# Patient Record
Sex: FEMALE | Race: WHITE | NOT HISPANIC OR LATINO | Employment: OTHER | URBAN - METROPOLITAN AREA
[De-identification: names, ages, dates, MRNs, and addresses within clinical notes are randomized per-mention and may not be internally consistent; named-entity substitution may affect disease eponyms.]

---

## 2023-09-22 ENCOUNTER — APPOINTMENT (OUTPATIENT)
Dept: GENERAL RADIOLOGY | Facility: CLINIC | Age: 83
End: 2023-09-22
Attending: NURSE PRACTITIONER
Payer: MEDICARE

## 2023-09-22 ENCOUNTER — HOSPITAL ENCOUNTER (EMERGENCY)
Facility: CLINIC | Age: 83
Discharge: HOME | End: 2023-09-22
Attending: NURSE PRACTITIONER | Admitting: NURSE PRACTITIONER
Payer: MEDICARE

## 2023-09-22 VITALS
TEMPERATURE: 96.5 F | DIASTOLIC BLOOD PRESSURE: 76 MMHG | RESPIRATION RATE: 18 BRPM | SYSTOLIC BLOOD PRESSURE: 124 MMHG | OXYGEN SATURATION: 95 % | HEART RATE: 57 BPM | HEIGHT: 66 IN | WEIGHT: 160 LBS | BODY MASS INDEX: 25.71 KG/M2

## 2023-09-22 DIAGNOSIS — M25.572 PAIN AND SWELLING OF LEFT ANKLE: ICD-10-CM

## 2023-09-22 DIAGNOSIS — M25.472 PAIN AND SWELLING OF LEFT ANKLE: ICD-10-CM

## 2023-09-22 PROCEDURE — 99203 OFFICE O/P NEW LOW 30 MIN: CPT | Performed by: NURSE PRACTITIONER

## 2023-09-22 PROCEDURE — 73610 X-RAY EXAM OF ANKLE: CPT | Mod: LT

## 2023-09-22 PROCEDURE — G0463 HOSPITAL OUTPT CLINIC VISIT: HCPCS | Performed by: NURSE PRACTITIONER

## 2023-09-22 NOTE — ED PROVIDER NOTES
ED Provider Note  [facility]      History     Chief Complaint   Patient presents with    Ankle Pain     HPI  Lolis Pope is a 83 year old female who left-sided ankle swelling since Wednesday when she stepped/stumbled onto her pontoon boat.  She has been using ice to reduce swelling.  Has a history of A-fib taking a blood thinner whic may also have increased bruising under the skin.  Sensation is normal in left foot ankle and leg in comparison with right.  Denies any other injury.  No shortness of breath or breathing difficulty.    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     Priority: Medium        Past Medical History:    Past Medical History:   Diagnosis Date    Osteoporosis        Past Surgical History:    Past Surgical History:   Procedure Laterality Date    APPENDECTOMY      GYN SURGERY      hysterectomy    ORTHOPEDIC SURGERY      tib/fib fx ORIF, R)knee replacement    PHACOEMULSIFICATION WITH STANDARD INTRAOCULAR LENS IMPLANT  11/4/2010    PHACOEMULSIFICATION WITH STANDARD INTRAOCULAR LENS IMPLANT performed by ARLINE MONROE at [facility]    PHACOEMULSIFICATION WITH STANDARD INTRAOCULAR LENS IMPLANT  11/22/2010    PHACOEMULSIFICATION WITH STANDARD INTRAOCULAR LENS IMPLANT performed by ARLINE MONROE at [facility]       Family History:    No family history on file.    Social History:  Marital Status:   [5]  Social History     Tobacco Use    Smoking status: Never   Substance Use Topics    Alcohol use: Yes     Comment: occasional    Drug use: No        Medications:    ALENdronate (FOSAMAX) 70 MG tablet  aspirin 81 MG tablet  calcium-magnesium (CALMAG) 500-250 MG TABS  cholecalciferol (VITAMIN D) 400 UNIT TABS  dorzolamide-timolol (COSOPT) 22.3-6.8 MG/ML ophthalmic solution  fish oil-omega-3 fatty acids (FISH OIL)  "1000 MG capsule  folic acid (FOLVITE) 1 MG tablet  travoprost, benzalkonium, (TRAVATAN) 0.004 % ophthalmic solution  vitamin E 400 UNIT capsule          Review of Systems  A medically appropriate review of systems was performed with pertinent positives and negatives noted in the HPI, and all other systems negative.    Physical Exam   Patient Vitals for the past 24 hrs:   BP Temp Temp src Pulse Resp SpO2 Height Weight   09/22/23 1253 124/76 (!) 96.5  F (35.8  C) Tympanic 57 18 95 % 1.676 m (5' 6\") 72.6 kg (160 lb)          Physical Exam  General: alert and in no acute distress on arrival  Lungs:  nonlabored  CV:  extremities warm and perfused.  Right ankle: No swelling full range of motion normal CMS.  Left ankle: Moderate swelling around malleolus.  Tolerates pointing her toe with slight reduction in range of motion due to pain.  Neuro: Patient awake, alert, speech is fluent, normal gross motor in left and right lower extremities.  Psychiatric: affect/mood normal, very pleasant.      ED Course      X-ray of left ankle completed no fracture no displacement soft tissue swelling ordered a cam walker pneumatic boot to reduce swelling and increased ability encouraged ice and elevation.  Follow-up with primary provider or us if symptoms worsen despite recommended treatment.                          No results found for this or any previous visit (from the past 24 hour(s)).    MEDICATIONS GIVEN IN THE EMERGENCY DEPARTMENT:  Medications - No data to display             Assessments & Plan (with Medical Decision Making)  83 year old female who presents to the Urgent Care for evaluation of left ankle swelling and pain.       I have reviewed the nursing notes.    I have reviewed the findings, diagnosis, plan and need for follow up with the patient.       X-ray of left ankle completed no fracture no displacement soft tissue swelling ordered a cam walker pneumatic boot to reduce swelling and increased ability encouraged ice and " elevation.  Follow-up with primary provider or us if symptoms worsen despite recommended treatment.    NEW PRESCRIPTIONS STARTED AT TODAY'S ER VISIT  New Prescriptions    No medications on file       Final diagnoses:   None       9/22/2023   [facility] EMERGENCY DEPT       Loren Cleaning, APRN CNP  09/22/23 141

## 2023-09-22 NOTE — ED TRIAGE NOTES
Pt did not hit head during the fall, on blood thinner.      Triage Assessment       Row Name 09/22/23 1252       Triage Assessment (Adult)    Airway WDL WDL       Respiratory WDL    Respiratory WDL WDL

## 2023-09-22 NOTE — ED TRIAGE NOTES
Pt fell off pontoon on Wednesday with left ankle pain, pain continues.      Triage Assessment       Row Name 09/22/23 1252       Triage Assessment (Adult)    Airway WDL WDL       Respiratory WDL    Respiratory WDL WDL

## 2023-09-22 NOTE — DISCHARGE INSTRUCTIONS
Follow-up with your primary care if symptoms or not improving with use of the cam walking boot.  Icing and elevation is also helpful for swelling it is common to have some existing symptoms for several weeks following pulling of ligaments and swelling.

## 2025-05-14 ENCOUNTER — APPOINTMENT (OUTPATIENT)
Dept: CT IMAGING | Facility: CLINIC | Age: 85
End: 2025-05-14
Attending: FAMILY MEDICINE
Payer: MEDICARE

## 2025-05-14 ENCOUNTER — HOSPITAL ENCOUNTER (OUTPATIENT)
Facility: CLINIC | Age: 85
Setting detail: OBSERVATION
Discharge: HOME | End: 2025-05-15
Attending: FAMILY MEDICINE | Admitting: STUDENT IN AN ORGANIZED HEALTH CARE EDUCATION/TRAINING PROGRAM
Payer: MEDICARE

## 2025-05-14 DIAGNOSIS — I48.91 ATRIAL FIBRILLATION WITH RVR (H): ICD-10-CM

## 2025-05-14 DIAGNOSIS — I48.3 TYPICAL ATRIAL FLUTTER (H): Primary | ICD-10-CM

## 2025-05-14 DIAGNOSIS — A49.9 UTI (URINARY TRACT INFECTION), BACTERIAL: ICD-10-CM

## 2025-05-14 DIAGNOSIS — N39.0 UTI (URINARY TRACT INFECTION), BACTERIAL: ICD-10-CM

## 2025-05-14 DIAGNOSIS — I95.9 TRANSIENT HYPOTENSION: ICD-10-CM

## 2025-05-14 DIAGNOSIS — I10 ESSENTIAL HYPERTENSION: ICD-10-CM

## 2025-05-14 PROBLEM — R29.818 SUSPECTED SLEEP APNEA: Status: ACTIVE | Noted: 2021-08-03

## 2025-05-14 PROBLEM — I48.92 ATRIAL FLUTTER (H): Status: ACTIVE | Noted: 2021-07-26

## 2025-05-14 PROBLEM — J30.9 ALLERGIC RHINITIS: Status: ACTIVE | Noted: 2021-07-26

## 2025-05-14 PROBLEM — Z87.891 FORMER SMOKER: Status: ACTIVE | Noted: 2024-07-30

## 2025-05-14 LAB
ALBUMIN SERPL BCG-MCNC: 4.1 G/DL (ref 3.5–5.2)
ALBUMIN UR-MCNC: 20 MG/DL
ALP SERPL-CCNC: 66 U/L (ref 40–150)
ALT SERPL W P-5'-P-CCNC: 16 U/L (ref 0–50)
ANION GAP SERPL CALCULATED.3IONS-SCNC: 13 MMOL/L (ref 7–15)
APPEARANCE UR: ABNORMAL
AST SERPL W P-5'-P-CCNC: 21 U/L (ref 0–45)
ATRIAL RATE - MUSE: NORMAL BPM
BACTERIA #/AREA URNS HPF: ABNORMAL /HPF
BASOPHILS # BLD AUTO: 0 10E3/UL (ref 0–0.2)
BASOPHILS NFR BLD AUTO: 0 %
BILIRUB SERPL-MCNC: 0.6 MG/DL
BILIRUB UR QL STRIP: NEGATIVE
BUN SERPL-MCNC: 26.2 MG/DL (ref 8–23)
CALCIUM SERPL-MCNC: 9.7 MG/DL (ref 8.8–10.4)
CHLORIDE SERPL-SCNC: 100 MMOL/L (ref 98–107)
COLOR UR AUTO: YELLOW
CREAT SERPL-MCNC: 1.07 MG/DL (ref 0.51–0.95)
DIASTOLIC BLOOD PRESSURE - MUSE: NORMAL MMHG
EGFRCR SERPLBLD CKD-EPI 2021: 50 ML/MIN/1.73M2
EOSINOPHIL # BLD AUTO: 0.1 10E3/UL (ref 0–0.7)
EOSINOPHIL NFR BLD AUTO: 2 %
ERYTHROCYTE [DISTWIDTH] IN BLOOD BY AUTOMATED COUNT: 12.8 % (ref 10–15)
FLUAV RNA SPEC QL NAA+PROBE: NEGATIVE
FLUBV RNA RESP QL NAA+PROBE: NEGATIVE
GLUCOSE SERPL-MCNC: 114 MG/DL (ref 70–99)
GLUCOSE UR STRIP-MCNC: NEGATIVE MG/DL
HCO3 SERPL-SCNC: 23 MMOL/L (ref 22–29)
HCT VFR BLD AUTO: 43.1 % (ref 35–47)
HGB BLD-MCNC: 14.4 G/DL (ref 11.7–15.7)
HGB UR QL STRIP: NEGATIVE
HYALINE CASTS: 19 /LPF
IMM GRANULOCYTES # BLD: 0 10E3/UL
IMM GRANULOCYTES NFR BLD: 0 %
INTERPRETATION ECG - MUSE: NORMAL
KETONES UR STRIP-MCNC: NEGATIVE MG/DL
LACTATE SERPL-SCNC: 1.5 MMOL/L (ref 0.7–2)
LEUKOCYTE ESTERASE UR QL STRIP: ABNORMAL
LIPASE SERPL-CCNC: 36 U/L (ref 13–60)
LYMPHOCYTES # BLD AUTO: 1.9 10E3/UL (ref 0.8–5.3)
LYMPHOCYTES NFR BLD AUTO: 23 %
MAGNESIUM SERPL-MCNC: 1.5 MG/DL (ref 1.7–2.3)
MCH RBC QN AUTO: 31.3 PG (ref 26.5–33)
MCHC RBC AUTO-ENTMCNC: 33.4 G/DL (ref 31.5–36.5)
MCV RBC AUTO: 94 FL (ref 78–100)
MONOCYTES # BLD AUTO: 0.5 10E3/UL (ref 0–1.3)
MONOCYTES NFR BLD AUTO: 7 %
MUCOUS THREADS #/AREA URNS LPF: PRESENT /LPF
NEUTROPHILS # BLD AUTO: 5.5 10E3/UL (ref 1.6–8.3)
NEUTROPHILS NFR BLD AUTO: 68 %
NITRATE UR QL: NEGATIVE
NRBC # BLD AUTO: 0 10E3/UL
NRBC BLD AUTO-RTO: 0 /100
P AXIS - MUSE: NORMAL DEGREES
PH UR STRIP: 5 [PH] (ref 5–7)
PLATELET # BLD AUTO: 227 10E3/UL (ref 150–450)
POTASSIUM SERPL-SCNC: 4.5 MMOL/L (ref 3.4–5.3)
PR INTERVAL - MUSE: NORMAL MS
PROT SERPL-MCNC: 6.5 G/DL (ref 6.4–8.3)
QRS DURATION - MUSE: 122 MS
QT - MUSE: 336 MS
QTC - MUSE: 525 MS
R AXIS - MUSE: -39 DEGREES
RBC # BLD AUTO: 4.6 10E6/UL (ref 3.8–5.2)
RBC URINE: 6 /HPF
RSV RNA SPEC NAA+PROBE: NEGATIVE
SARS-COV-2 RNA RESP QL NAA+PROBE: NEGATIVE
SODIUM SERPL-SCNC: 136 MMOL/L (ref 135–145)
SP GR UR STRIP: 1.02 (ref 1–1.03)
SQUAMOUS EPITHELIAL: 2 /HPF
SYSTOLIC BLOOD PRESSURE - MUSE: NORMAL MMHG
T AXIS - MUSE: 106 DEGREES
TROPONIN T SERPL HS-MCNC: 13 NG/L
TROPONIN T SERPL HS-MCNC: 9 NG/L
UROBILINOGEN UR STRIP-MCNC: 2 MG/DL
VENTRICULAR RATE- MUSE: 147 BPM
WBC # BLD AUTO: 8.1 10E3/UL (ref 4–11)
WBC URINE: 95 /HPF

## 2025-05-14 PROCEDURE — G0378 HOSPITAL OBSERVATION PER HR: HCPCS

## 2025-05-14 PROCEDURE — 250N000011 HC RX IP 250 OP 636

## 2025-05-14 PROCEDURE — 71275 CT ANGIOGRAPHY CHEST: CPT

## 2025-05-14 PROCEDURE — 250N000009 HC RX 250: Performed by: FAMILY MEDICINE

## 2025-05-14 PROCEDURE — 81003 URINALYSIS AUTO W/O SCOPE: CPT | Performed by: FAMILY MEDICINE

## 2025-05-14 PROCEDURE — 96375 TX/PRO/DX INJ NEW DRUG ADDON: CPT | Mod: XU

## 2025-05-14 PROCEDURE — 93010 ELECTROCARDIOGRAM REPORT: CPT | Performed by: FAMILY MEDICINE

## 2025-05-14 PROCEDURE — 36415 COLL VENOUS BLD VENIPUNCTURE: CPT | Performed by: FAMILY MEDICINE

## 2025-05-14 PROCEDURE — 99285 EMERGENCY DEPT VISIT HI MDM: CPT | Mod: 25

## 2025-05-14 PROCEDURE — 99223 1ST HOSP IP/OBS HIGH 75: CPT

## 2025-05-14 PROCEDURE — 99285 EMERGENCY DEPT VISIT HI MDM: CPT | Mod: 25 | Performed by: FAMILY MEDICINE

## 2025-05-14 PROCEDURE — 93005 ELECTROCARDIOGRAM TRACING: CPT

## 2025-05-14 PROCEDURE — 250N000011 HC RX IP 250 OP 636: Performed by: FAMILY MEDICINE

## 2025-05-14 PROCEDURE — 96361 HYDRATE IV INFUSION ADD-ON: CPT

## 2025-05-14 PROCEDURE — 84484 ASSAY OF TROPONIN QUANT: CPT | Performed by: FAMILY MEDICINE

## 2025-05-14 PROCEDURE — 250N000013 HC RX MED GY IP 250 OP 250 PS 637

## 2025-05-14 PROCEDURE — 83690 ASSAY OF LIPASE: CPT | Performed by: FAMILY MEDICINE

## 2025-05-14 PROCEDURE — 83605 ASSAY OF LACTIC ACID: CPT | Performed by: FAMILY MEDICINE

## 2025-05-14 PROCEDURE — 87186 SC STD MICRODIL/AGAR DIL: CPT | Performed by: FAMILY MEDICINE

## 2025-05-14 PROCEDURE — 80053 COMPREHEN METABOLIC PANEL: CPT | Performed by: FAMILY MEDICINE

## 2025-05-14 PROCEDURE — 87637 SARSCOV2&INF A&B&RSV AMP PRB: CPT | Performed by: FAMILY MEDICINE

## 2025-05-14 PROCEDURE — 85004 AUTOMATED DIFF WBC COUNT: CPT | Performed by: FAMILY MEDICINE

## 2025-05-14 PROCEDURE — 83735 ASSAY OF MAGNESIUM: CPT

## 2025-05-14 PROCEDURE — 258N000003 HC RX IP 258 OP 636: Performed by: FAMILY MEDICINE

## 2025-05-14 PROCEDURE — 96365 THER/PROPH/DIAG IV INF INIT: CPT | Mod: XU

## 2025-05-14 RX ORDER — SIMVASTATIN 20 MG
20 TABLET ORAL DAILY
COMMUNITY
Start: 2024-11-08

## 2025-05-14 RX ORDER — PROCHLORPERAZINE MALEATE 5 MG/1
5 TABLET ORAL EVERY 6 HOURS PRN
Status: DISCONTINUED | OUTPATIENT
Start: 2025-05-14 | End: 2025-05-15 | Stop reason: HOSPADM

## 2025-05-14 RX ORDER — ONDANSETRON 4 MG/1
4 TABLET, ORALLY DISINTEGRATING ORAL EVERY 6 HOURS PRN
Status: DISCONTINUED | OUTPATIENT
Start: 2025-05-14 | End: 2025-05-15 | Stop reason: HOSPADM

## 2025-05-14 RX ORDER — PILOCARPINE HYDROCHLORIDE 10 MG/ML
1 SOLUTION/ DROPS OPHTHALMIC 3 TIMES DAILY
Status: DISCONTINUED | OUTPATIENT
Start: 2025-05-14 | End: 2025-05-15

## 2025-05-14 RX ORDER — DORZOLAMIDE HYDROCHLORIDE AND TIMOLOL MALEATE 20; 5 MG/ML; MG/ML
1 SOLUTION/ DROPS OPHTHALMIC 2 TIMES DAILY
Status: DISCONTINUED | OUTPATIENT
Start: 2025-05-14 | End: 2025-05-15 | Stop reason: HOSPADM

## 2025-05-14 RX ORDER — ASCORBIC ACID 500 MG
1 TABLET ORAL AT BEDTIME
COMMUNITY

## 2025-05-14 RX ORDER — ONDANSETRON 2 MG/ML
4 INJECTION INTRAMUSCULAR; INTRAVENOUS ONCE
Status: COMPLETED | OUTPATIENT
Start: 2025-05-14 | End: 2025-05-14

## 2025-05-14 RX ORDER — MAGNESIUM SULFATE HEPTAHYDRATE 40 MG/ML
4 INJECTION, SOLUTION INTRAVENOUS ONCE
Status: COMPLETED | OUTPATIENT
Start: 2025-05-14 | End: 2025-05-14

## 2025-05-14 RX ORDER — CEFTRIAXONE 2 G/1
2 INJECTION, POWDER, FOR SOLUTION INTRAMUSCULAR; INTRAVENOUS EVERY 24 HOURS
Status: DISCONTINUED | OUTPATIENT
Start: 2025-05-15 | End: 2025-05-15

## 2025-05-14 RX ORDER — OXYBUTYNIN CHLORIDE 5 MG/1
5 TABLET, EXTENDED RELEASE ORAL DAILY
Status: DISCONTINUED | OUTPATIENT
Start: 2025-05-15 | End: 2025-05-15 | Stop reason: HOSPADM

## 2025-05-14 RX ORDER — ONDANSETRON 2 MG/ML
4 INJECTION INTRAMUSCULAR; INTRAVENOUS EVERY 6 HOURS PRN
Status: DISCONTINUED | OUTPATIENT
Start: 2025-05-14 | End: 2025-05-15 | Stop reason: HOSPADM

## 2025-05-14 RX ORDER — RIVAROXABAN 20 MG/1
20 TABLET, FILM COATED ORAL EVERY EVENING
COMMUNITY

## 2025-05-14 RX ORDER — METOPROLOL SUCCINATE 25 MG/1
25 TABLET, EXTENDED RELEASE ORAL DAILY
Status: DISCONTINUED | OUTPATIENT
Start: 2025-05-14 | End: 2025-05-15 | Stop reason: HOSPADM

## 2025-05-14 RX ORDER — EPINEPHRINE 0.3 MG/.3ML
INJECTION SUBCUTANEOUS
COMMUNITY
Start: 2023-08-22

## 2025-05-14 RX ORDER — ACETAMINOPHEN 500 MG
1 TABLET ORAL 3 TIMES DAILY
COMMUNITY

## 2025-05-14 RX ORDER — CLINDAMYCIN HYDROCHLORIDE 150 MG/1
CAPSULE ORAL
COMMUNITY
Start: 2025-05-06

## 2025-05-14 RX ORDER — ACETAMINOPHEN 325 MG/1
650 TABLET ORAL EVERY 4 HOURS PRN
Status: DISCONTINUED | OUTPATIENT
Start: 2025-05-14 | End: 2025-05-15 | Stop reason: HOSPADM

## 2025-05-14 RX ORDER — CITALOPRAM HYDROBROMIDE 10 MG/1
10 TABLET ORAL DAILY
COMMUNITY
Start: 2025-04-15

## 2025-05-14 RX ORDER — LISINOPRIL AND HYDROCHLOROTHIAZIDE 12.5; 2 MG/1; MG/1
1 TABLET ORAL DAILY
Status: ON HOLD | COMMUNITY
Start: 2025-04-15 | End: 2025-05-15

## 2025-05-14 RX ORDER — LATANOPROST 50 UG/ML
1 SOLUTION/ DROPS OPHTHALMIC AT BEDTIME
Status: DISCONTINUED | OUTPATIENT
Start: 2025-05-14 | End: 2025-05-15 | Stop reason: HOSPADM

## 2025-05-14 RX ORDER — CITALOPRAM HYDROBROMIDE 10 MG/1
10 TABLET ORAL DAILY
Status: DISCONTINUED | OUTPATIENT
Start: 2025-05-14 | End: 2025-05-15 | Stop reason: HOSPADM

## 2025-05-14 RX ORDER — METOPROLOL TARTRATE 1 MG/ML
5 INJECTION, SOLUTION INTRAVENOUS EVERY 5 MIN PRN
Status: DISCONTINUED | OUTPATIENT
Start: 2025-05-14 | End: 2025-05-15 | Stop reason: HOSPADM

## 2025-05-14 RX ORDER — LATANOPROST 50 UG/ML
1 SOLUTION/ DROPS OPHTHALMIC AT BEDTIME
COMMUNITY

## 2025-05-14 RX ORDER — IOPAMIDOL 755 MG/ML
72 INJECTION, SOLUTION INTRAVASCULAR ONCE
Status: COMPLETED | OUTPATIENT
Start: 2025-05-14 | End: 2025-05-14

## 2025-05-14 RX ORDER — OXYBUTYNIN CHLORIDE 5 MG/1
5 TABLET, EXTENDED RELEASE ORAL DAILY
COMMUNITY
Start: 2025-04-15 | End: 2026-04-15

## 2025-05-14 RX ORDER — LISINOPRIL AND HYDROCHLOROTHIAZIDE 12.5; 2 MG/1; MG/1
1 TABLET ORAL DAILY
Status: DISCONTINUED | OUTPATIENT
Start: 2025-05-15 | End: 2025-05-15 | Stop reason: HOSPADM

## 2025-05-14 RX ORDER — SIMVASTATIN 20 MG
20 TABLET ORAL AT BEDTIME
Status: DISCONTINUED | OUTPATIENT
Start: 2025-05-14 | End: 2025-05-15 | Stop reason: HOSPADM

## 2025-05-14 RX ORDER — METOPROLOL TARTRATE 25 MG/1
25 TABLET, FILM COATED ORAL ONCE
Status: DISCONTINUED | OUTPATIENT
Start: 2025-05-14 | End: 2025-05-14

## 2025-05-14 RX ORDER — METOPROLOL SUCCINATE 25 MG/1
25 TABLET, EXTENDED RELEASE ORAL DAILY
COMMUNITY
Start: 2025-04-15

## 2025-05-14 RX ORDER — CEFTRIAXONE 2 G/1
2 INJECTION, POWDER, FOR SOLUTION INTRAMUSCULAR; INTRAVENOUS ONCE
Status: COMPLETED | OUTPATIENT
Start: 2025-05-14 | End: 2025-05-14

## 2025-05-14 RX ORDER — SENNOSIDES 8.6 MG/1
1 TABLET ORAL EVERY EVENING
COMMUNITY

## 2025-05-14 RX ORDER — PILOCARPINE HYDROCHLORIDE 10 MG/ML
1 SOLUTION/ DROPS OPHTHALMIC 3 TIMES DAILY
COMMUNITY

## 2025-05-14 RX ADMIN — LATANOPROST 1 DROP: 50 SOLUTION/ DROPS OPHTHALMIC at 20:57

## 2025-05-14 RX ADMIN — METOPROLOL SUCCINATE 25 MG: 25 TABLET, EXTENDED RELEASE ORAL at 17:29

## 2025-05-14 RX ADMIN — SIMVASTATIN 20 MG: 20 TABLET, FILM COATED ORAL at 20:57

## 2025-05-14 RX ADMIN — RIVAROXABAN 20 MG: 10 TABLET, FILM COATED ORAL at 19:02

## 2025-05-14 RX ADMIN — ACETAMINOPHEN 650 MG: 325 TABLET, FILM COATED ORAL at 23:54

## 2025-05-14 RX ADMIN — MAGNESIUM SULFATE HEPTAHYDRATE 4 G: 4 INJECTION, SOLUTION INTRAVENOUS at 16:03

## 2025-05-14 RX ADMIN — SODIUM CHLORIDE 1000 ML: 0.9 INJECTION, SOLUTION INTRAVENOUS at 12:19

## 2025-05-14 RX ADMIN — SODIUM CHLORIDE 98 ML: 9 INJECTION, SOLUTION INTRAVENOUS at 13:33

## 2025-05-14 RX ADMIN — IOPAMIDOL 72 ML: 755 INJECTION, SOLUTION INTRAVENOUS at 13:33

## 2025-05-14 RX ADMIN — ACETAMINOPHEN 650 MG: 325 TABLET, FILM COATED ORAL at 19:03

## 2025-05-14 RX ADMIN — METOPROLOL TARTRATE 5 MG: 5 INJECTION INTRAVENOUS at 15:55

## 2025-05-14 RX ADMIN — SODIUM CHLORIDE 1000 ML: 0.9 INJECTION, SOLUTION INTRAVENOUS at 10:43

## 2025-05-14 RX ADMIN — ONDANSETRON 4 MG: 2 INJECTION INTRAMUSCULAR; INTRAVENOUS at 10:48

## 2025-05-14 RX ADMIN — DORZOLAMIDE HYDROCHLORIDE AND TIMOLOL MALEATE 1 DROP: 20; 5 SOLUTION/ DROPS OPHTHALMIC at 20:57

## 2025-05-14 RX ADMIN — CEFTRIAXONE 2 G: 2 INJECTION, POWDER, FOR SOLUTION INTRAMUSCULAR; INTRAVENOUS at 12:27

## 2025-05-14 RX ADMIN — CITALOPRAM HYDROBROMIDE 10 MG: 10 TABLET ORAL at 17:29

## 2025-05-14 ASSESSMENT — COLUMBIA-SUICIDE SEVERITY RATING SCALE - C-SSRS
6. HAVE YOU EVER DONE ANYTHING, STARTED TO DO ANYTHING, OR PREPARED TO DO ANYTHING TO END YOUR LIFE?: NO
2. HAVE YOU ACTUALLY HAD ANY THOUGHTS OF KILLING YOURSELF IN THE PAST MONTH?: NO
1. IN THE PAST MONTH, HAVE YOU WISHED YOU WERE DEAD OR WISHED YOU COULD GO TO SLEEP AND NOT WAKE UP?: NO

## 2025-05-14 ASSESSMENT — ACTIVITIES OF DAILY LIVING (ADL)
ADLS_ACUITY_SCORE: 36
ADLS_ACUITY_SCORE: 51
ADLS_ACUITY_SCORE: 41
ADLS_ACUITY_SCORE: 36
ADLS_ACUITY_SCORE: 41
ADLS_ACUITY_SCORE: 36

## 2025-05-14 NOTE — ED NOTES
"[facility]   Admission Handoff    The patient is Lolis Pope, 85 year old who arrived in the ED by CAR from home with a complaint of Generalized Weakness, Nausea, and Hypotension  . The patient's current symptoms are new and during this time the symptoms have increased. In the ED, patient was diagnosed with   Final diagnoses:   UTI (urinary tract infection), bacterial   Atrial fibrillation with RVR (H)   Transient hypotension         Needed?: No    Allergies:    Allergies   Allergen Reactions    Seafood GI Disturbance and Hives    Shrimp      Other Reaction(s): Extrapyramidal Symptoms    Apixaban Rash    Influenza Virus Vaccine     Amoxicillin Rash       Past Medical Hx:   Past Medical History:   Diagnosis Date    Osteoporosis        Initial vitals were: BP: (!) 132/120 (right arm)  Pulse: (!) 146  Temp: 98.5  F (36.9  C)  Resp: 16  Height: 167.6 cm (5' 6\")  Weight: 72.6 kg (160 lb)  SpO2: 96 %   Recent vital Signs: BP 97/79   Pulse (!) 138   Temp 98.5  F (36.9  C) (Oral)   Resp 20   Ht 1.676 m (5' 6\")   Wt 72.6 kg (160 lb)   SpO2 93%   BMI 25.82 kg/m      Elimination Status: Continent: Yes     Activity Level: SBA    Fall Status: Reason for falls risk:  Mobility and Reason for falls risk: Elimination  nonskid shoes/slippers when out of bed, arm band in place, patient and family education, and assistive device/personal items within reach    Baseline Mental status: WDL  Current Mental Status changes: at basesline    Infection present or suspected this encounter: yes urinary  Sepsis suspected: No    Isolation type: None    Bariatric equipment needed?: No    In the ED these meds were given:   Medications   metoprolol (LOPRESSOR) injection 5 mg (5 mg Intravenous $Given 5/14/25 1099)   magnesium sulfate 4 g in 100 mL sterile water intermittent infusion (4 g Intravenous $New Bag 5/14/25 1601)   sodium chloride 0.9% BOLUS 1,000 mL (0 mLs Intravenous Stopped 5/14/25 1220) "   ondansetron (ZOFRAN) injection 4 mg (4 mg Intravenous $Given 5/14/25 1048)   sodium chloride 0.9% BOLUS 1,000 mL (0 mLs Intravenous Stopped 5/14/25 1324)   cefTRIAXone (ROCEPHIN) 2 g vial to attach to  ml bag for ADULTS or NS 50 ml bag for PEDS (0 g Intravenous Stopped 5/14/25 1324)   iopamidol (ISOVUE-370) solution 72 mL (72 mLs Intravenous $Given 5/14/25 1333)   sodium chloride 0.9 % bag for CT scan flush (98 mLs Intravenous $Given 5/14/25 1333)       Drips running?  Yes    Home pump  No    Current LDAs: Peripheral IV: Site LAC; Gauge 20  none     Results:   Labs/Imaging  Ordered and Resulted from Time of ED Arrival Up to the Time of Departure from the ED  Results for orders placed or performed during the hospital encounter of 05/14/25 (from the past 24 hours)   EKG 12-lead, tracing only   Result Value Ref Range    Systolic Blood Pressure  mmHg    Diastolic Blood Pressure  mmHg    Ventricular Rate 147 BPM    Atrial Rate  BPM    AL Interval  ms    QRS Duration 122 ms     ms    QTc 525 ms    P Axis  degrees    R AXIS -39 degrees    T Axis 106 degrees    Interpretation ECG       Wide QRS tachycardia  Left axis deviation  Left bundle branch block  Abnormal ECG  No previous ECGs available     CBC with platelets, differential    Narrative    The following orders were created for panel order CBC with platelets, differential.  Procedure                               Abnormality         Status                     ---------                               -----------         ------                     CBC with platelets and ...[[id number]]                      Final result                 Please view results for these tests on the individual orders.   Comprehensive metabolic panel   Result Value Ref Range    Sodium 136 135 - 145 mmol/L    Potassium 4.5 3.4 - 5.3 mmol/L    Carbon Dioxide (CO2) 23 22 - 29 mmol/L    Anion Gap 13 7 - 15 mmol/L    Urea Nitrogen 26.2 (H) 8.0 - 23.0 mg/dL    Creatinine 1.07 (H) 0.51 -  0.95 mg/dL    GFR Estimate 50 (L) >60 mL/min/1.73m2    Calcium 9.7 8.8 - 10.4 mg/dL    Chloride 100 98 - 107 mmol/L    Glucose 114 (H) 70 - 99 mg/dL    Alkaline Phosphatase 66 40 - 150 U/L    AST 21 0 - 45 U/L    ALT 16 0 - 50 U/L    Protein Total 6.5 6.4 - 8.3 g/dL    Albumin 4.1 3.5 - 5.2 g/dL    Bilirubin Total 0.6 <=1.2 mg/dL   Troponin T, High Sensitivity   Result Value Ref Range    Troponin T, High Sensitivity 13 <=14 ng/L   Lipase   Result Value Ref Range    Lipase 36 13 - 60 U/L   Lactic Acid Whole Blood with 1X Repeat in 2 HR when >2   Result Value Ref Range    Lactic Acid, Initial 1.5 0.7 - 2.0 mmol/L   CBC with platelets and differential   Result Value Ref Range    WBC Count 8.1 4.0 - 11.0 10e3/uL    RBC Count 4.60 3.80 - 5.20 10e6/uL    Hemoglobin 14.4 11.7 - 15.7 g/dL    Hematocrit 43.1 35.0 - 47.0 %    MCV 94 78 - 100 fL    MCH 31.3 26.5 - 33.0 pg    MCHC 33.4 31.5 - 36.5 g/dL    RDW 12.8 10.0 - 15.0 %    Platelet Count 227 150 - 450 10e3/uL    % Neutrophils 68 %    % Lymphocytes 23 %    % Monocytes 7 %    % Eosinophils 2 %    % Basophils 0 %    % Immature Granulocytes 0 %    NRBCs per 100 WBC 0 <1 /100    Absolute Neutrophils 5.5 1.6 - 8.3 10e3/uL    Absolute Lymphocytes 1.9 0.8 - 5.3 10e3/uL    Absolute Monocytes 0.5 0.0 - 1.3 10e3/uL    Absolute Eosinophils 0.1 0.0 - 0.7 10e3/uL    Absolute Basophils 0.0 0.0 - 0.2 10e3/uL    Absolute Immature Granulocytes 0.0 <=0.4 10e3/uL    Absolute NRBCs 0.0 10e3/uL   Influenza A/B, RSV and SARS-CoV2 PCR (COVID-19) Nose    Specimen: Nose; Swab   Result Value Ref Range    Influenza A PCR Negative Negative    Influenza B PCR Negative Negative    RSV PCR Negative Negative    SARS CoV2 PCR Negative Negative    Narrative    Testing was performed using the Xpert Xpress CoV2/Flu/RSV Assay on the [employer] Gene[employer]pert Instrument. This test should be ordered for the detection of SARS-CoV2, influenza, and RSV viruses in individuals with signs and symptoms of respiratory tract  infection. This test is for in vitro diagnostic use under the [place] FDA for laboratories certified under CLIA to perform high or moderate complexity testing. This test has been [place] FDA cleared. A negative result does not rule out the presence of PCR inhibitors in the specimen or target RNA in concentration below the limit of detection for the assay. If only one viral target is positive but coinfection with multiple targets is suspected, the sample should be re-tested with another FDA cleared, approved, or authorized test, if coninfection would change clinical management. This test was validated by the [facility] [employer]. These laboratories are certified under the Clinical Laboratory Improvement Amendments of 1988 (CLIA-88) as qualified to perfom high complexity laboratory testing.   UA with Microscopic reflex to Culture    Specimen: Urine, Midstream   Result Value Ref Range    Color Urine Yellow Colorless, Straw, Light Yellow, Yellow    Appearance Urine Slightly Cloudy (A) Clear    Glucose Urine Negative Negative mg/dL    Bilirubin Urine Negative Negative    Ketones Urine Negative Negative mg/dL    Specific Gravity Urine 1.025 1.003 - 1.035    Blood Urine Negative Negative    pH Urine 5.0 5.0 - 7.0    Protein Albumin Urine 20 (A) Negative mg/dL    Urobilinogen Urine 2.0 (A) Normal mg/dL    Nitrite Urine Negative Negative    Leukocyte Esterase Urine Large (A) Negative    Bacteria Urine Moderate (A) None Seen /HPF    Mucus Urine Present (A) None Seen /LPF    RBC Urine 6 (H) <=2 /HPF    WBC Urine 95 (H) <=5 /HPF    Squamous Epithelials Urine 2 (H) <=1 /HPF    Hyaline Casts Urine 19 (H) <=2 /LPF    Narrative    Urine Culture ordered based on laboratory criteria   Troponin T, High Sensitivity   Result Value Ref Range    Troponin T, High Sensitivity 9 <=14 ng/L   Magnesium   Result Value Ref Range    Magnesium 1.5 (L) 1.7 - 2.3 mg/dL   CT Chest Pulmonary Embolism w Contrast    Narrative    EXAM: CT CHEST PULMONARY  EMBOLISM W CONTRAST  LOCATION: [facility]  DATE: 5/14/2025    INDICATION: Off Xarelto for five days preoperatively, generalized weakness, hypotension, tachycardia, evaluate for PE.    COMPARISON: Chest x-ray dated 7/26/2021. CT chest, abdomen and pelvis dated 9/13/2009.    TECHNIQUE: CT chest pulmonary angiogram during arterial phase injection of intravenous contrast. Multiplanar reformats and MIP reconstructions were performed. Dose reduction techniques were used.     CONTRAST: 72 mL Isovue 370.    FINDINGS:  ANGIOGRAM CHEST: Pulmonary arteries are normal caliber and negative for pulmonary emboli. Thoracic aorta is not well opacified and is  indeterminate for dissection. No CT evidence of right heart strain.    LUNGS AND PLEURA: Interstitial opacities are seen in the left lower lobe. Large airways are patent. A few benign calcified pulmonary granulomas are present. No suspicious-appearing pulmonary nodules or masses. No pleural fluid.    MEDIASTINUM/AXILLAE: Heart size is mildly enlarged. No pericardial fluid. There is mild dilation of the ascending thoracic aorta measuring up to 3.9 cm. Moderate thoracic aortic atherosclerosis is present. The main pulmonary artery is mildly dilated at   3.5 cm, which can be seen with pulmonary artery hypertension in the appropriate clinical setting. A couple prominent to mildly enlarged AP window lymph nodes are present and mildly increased in size to comparison with the largest measuring 15 x 13 mm   (4-91), previously 15 x 11 mm.    CORONARY ARTERY CALCIFICATION: Mild.    UPPER ABDOMEN: There are several scattered hepatic cysts present, which are similar to comparison and do not require follow-up. 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 Mild to moderate atherosclerosis in the imaged abdominal aorta is noted.    MUSCULOSKELETAL: Slight scoliotic curvature of  the spine. Suspected bone island in a mid thoracic vertebral body segment is similar to comparison. No follow-up is necessary. No aggressive-appearing lytic or blastic osseous lesions.      Impression    IMPRESSION:  1.  No pulmonary artery embolism.  2.  Interstitial opacities in the left lower lobe which may represent scarring, atelectasis, edema and/or pneumonitis.  3.  Nonspecific, prominent to mildly enlarged AP window lymph nodes.  4.  Mild dilation of the ascending thoracic aorta.  5.  Dilation of the main pulmonary artery, which can be seen with pulmonary artery hypertension in the appropriate clinical setting.  6.  Mild cardiomegaly.         For the majority of the shift this patient's behavior was Green     Cardiac Rhythm: Atrial rhythm  Pt needs tele? Yes  Skin/wound Issues: None    Code Status: Full Code    Pain control: good    Nausea control: good    Abnormal labs/tests/findings requiring intervention: UA, Mg    Patient tested for COVID 19 prior to admission: YES     OBS brochure/video discussed/provided to patient/family: Yes     Family present during ED course? Yes     Family Comments/Social Situation comments: None    Tasks needing completion: None    Latia Boogie RN

## 2025-05-14 NOTE — ED NOTES
Pts SBP 86 after 1 L IVF. MD notified, writer started second L of IVF. Pt reports no new dizziness or weakness.

## 2025-05-14 NOTE — H&P
"[facility]    History and Physical  Hospital Medicine       Date of Admission:  5/14/2025  Date of Service: 5/14/2025     Assessment & Plan   Lolis Pope is a 85 year old female with past medical history of CKD3, atrial fibrillation on anticoagulation currently on hold for pre-planned outpatient surgery, sleep apnea, hypertension, former tobacco user now presents on 5/14/2025 with nausea, weakness, and hypotension. She is being admitted for afib with RVR & transient hypotension plus UTI.     Atrial flutter / fib with RVR  Hypotension    Presents feeling generally weak, run down, mildly lightheaded since 5/11. No chest pain or syncope. Checked BP AM 5/14 due to persistent \"off\" feeling & noticed it was low. Notably, has history of atrial flutter requiring cardioversion 7/26/2021.     On presentation to ER she is tachycardic ('s) and became hypotensive (BP 69/56). Received 2L IVF with minimal response in HR & slight improvement in BP (SBP 90's). EKG shows wide-complex tachycardia appearing like possible flutter? Troponin negative (13 ?  9). S/p 4g Mg sulfate in ER (initially Mg 1.5).     Trialing Lopressor in ER pending ongoing BP stability. Notably, patient has been holding Rivaroxaban for knee surgery scheduled 5/14 (cancelled) and just resumed 5/13. CT PE study negative for PE.   - Metoprolol tartrate IV 5mg Q15 min PRN x3 doses for heart rate >100bpm; holding for SBP <90  - Continuous telemetry  - Continue PTA metoprolol succinate from 25mg daily for now. Consider increase if BP allows.   - Continue PTA Rivaroxaban 20mg daily   - EP referral placed in discharge navigator    Possible heart failure    Presents with atrial flutter with RVR & hypotension. On admission exam she has mild JVD, no LE edema. CT PE study shows left lower lobe interstitial opacities which could be scarring vs edema / pneumonitis, and mild dilation of main pulmonary artery which can be seen with pulm " HTN.     Lexiscan in 2014 was WNL with EF 68%. ECHO 7/2021 LVEF 50% with mild-moderate RV systolic dysfunction & bi-atrial enlargement.     Clinically could be in mild HF 2/2 increased rates with a-flutter (above). Despite this, she tolerated 2L IVF in ER rather well.   - Hold on further IVF for now  - Anticipate improvement with rate control. Management of a-flutter, above.   - Continuous telemetry  - Strict I&O  - Daily weights    Urinary tract infection, bacterial  Chronic urinary urgency & incontinence    Mild darkening in urine coloration; chronic urgency & incontinence is unchanged. No flank or suprapubic pain.     UA suggestive of UTI: slightly cloudy, large leuk esterase, 95 WBC, moderate bacteria, 6 RBC, mucus, and 19 hyaline casts. Generally poor sample with 2 squamous cells.     No leukocytosis (WBC 8.1). Lactic acid WNL (1.5). Not septic.    Patient has mild UTI; infxn could be contributing etiology to flutter with RVR, above.   - Ceftriaxone 2g IV Q24hrs   - Urine culture 5/14 NGTD  - Continue PTA oxybutynin 5mg daily     Hypomagnesemia  Mg 1.5 on presentation. K >4.   - Mag sulfate 4g IV one time  - Recheck Mg in AM    Knee osteoarthritis  Regarding rescheduling knee arthroplasty, would consider rescheduling >4wks out given patient's conversion to sinus rhythm after admission.    Essential hypertension    Intermittently hypotensive since presentation 2/2 afib with RVR.   - Continue PTA lisinopril-hydrochlorothiazide 20-12.5mg daily with hold parameters    CKD (chronic kidney disease) stage 3  Baseline creatinine around 1.0, creatinine on presentation 1.07, GFR 50.     Obstructive sleep apnea  Referred after episode of atrial flutter in 2021. Sleep study showed ESTELA. Was prescribed PAP therapy, unclear if she ever started this.     Clinically Significant Risk Factors Present on Admission                # Drug Induced Coagulation Defect: home medication list includes an anticoagulant medication    #  "Hypertension: Noted on problem list           # Overweight: Estimated body mass index is 25.82 kg/m  as calculated from the following:    Height as of this encounter: 1.676 m (5' 6\").    Weight as of this encounter: 72.6 kg (160 lb).               Diet:  cardiac   DVT Prophylaxis: DOAC  Blakely Catheter: Not present  Code Status:  full code  Lines: PIV, telemetry    Disposition Plan   Medically Ready for Discharge: Anticipated Tomorrow     The patient's care was discussed with the Attending Physician, Dr. Anoop Méndez, bedside RN, and the patient.    Vivienne Brice PA-C        Primary Care Physician   Snehal Napoles [phone]    History is obtained from the patient, who is a decent historian, patient's son who is at the bedside and provides helpful supplemental information, handoff from ER provider, and review of old records via the EMR.    History of Present Illness   Lolis Pope is a 85 year old female with past medical history of CKD3, atrial fibrillation on anticoagulation currently on hold for pre-planned outpatient surgery, sleep apnea, hypertension, former tobacco user now presents on 5/14/2025 with nausea, weakness, and hypotension.     Reports a history of afib for which she was admitted in [facility] and required cardioversion after failure of pharmacologic management to control rate.     Now says that beginning Sunday 5/11 she has been feeling generally run-down, weak, and not herself. She feels intermittently lightheaded, without syncope or falls. Denies chest pain, pressure, palpitations, or pleurisy. Does endorse worsening heartburn which she notices randomly as well as after meals, increasing in frequency over the past few months. Denies other abdominal pain, nausea, or emesis. She feels symptoms are similar to when she had afib with RVR previously.     Denies fevers or chills. Denies dysuria, hematuria. Endorses intermittently dark urine but no changes in smell. Has chronic nocturia about " 4x/night with some incontinence which is not new and unchanged from baseline.     Patient presented to ER 5/14 because she was not feeling any better this AM & took her BP and noticed it was much lower than usual. She has been taking her medications as prescribed, and has been holding her Rivaroxaban for a knee surgery. Unfortunately, her knee surgery scheduled for 5/14 was cancelled because she had to have a tooth extraction about 1 week PTA and when orthopedics found out about extraction they wanted to wait until oral healing was complete to plan surgery. She resumed her Rivaroxaban on 5/13.     Review of Systems    ROS: 10 point ROS neg other than the symptoms noted above in the HPI.    Past Medical History    Past Medical History:   Diagnosis Date    Osteoporosis      Past Surgical History   Past Surgical History:   Procedure Laterality Date    APPENDECTOMY      GYN SURGERY      hysterectomy    ORTHOPEDIC SURGERY      tib/fib fx ORIF, R)knee replacement    PHACOEMULSIFICATION WITH STANDARD INTRAOCULAR LENS IMPLANT  11/4/2010    PHACOEMULSIFICATION WITH STANDARD INTRAOCULAR LENS IMPLANT performed by ARLINE MONROE at [facility]    PHACOEMULSIFICATION WITH STANDARD INTRAOCULAR LENS IMPLANT  11/22/2010    PHACOEMULSIFICATION WITH STANDARD INTRAOCULAR LENS IMPLANT performed by ARLINE MONROE at [facility]      Prior to Admission Medications   Prior to Admission Medications   Prescriptions Last Dose Informant Patient Reported? Taking?   EPINEPHrine (ANY BX GENERIC EQUIV) 0.3 MG/0.3ML injection 2-pack  Self Yes Yes   Sig: INJECT CONTENTS OF 1 PEN AS NEEDED FOR ALLERGIC REACTION AS NEEDED. MAY REPEAT   Melatonin 300 MCG TABS 5/13/2025 Bedtime  Yes Yes   Sig: Take 1 tablet by mouth at bedtime.   XARELTO ANTICOAGULANT 20 MG TABS tablet 5/13/2025 Evening Self Yes Yes   Sig: Take 20 mg by mouth every evening.   acetaminophen (TYLENOL) 500 MG tablet 5/14/2025 Morning  Yes Yes   Sig: Take 1 tablet by mouth 3 times daily.    calcium-magnesium (CALMAG) 500-250 MG TABS 5/4/2025 Self Yes Yes   Sig: Take 1 tablet by mouth 2 times daily (with meals).   cholecalciferol (VITAMIN D) 400 UNIT TABS 5/4/2025 Self Yes Yes   Sig: Take 400 Units by mouth daily.   citalopram (CELEXA) 10 MG tablet 5/13/2025 Morning Self Yes Yes   Sig: Take 10 mg by mouth daily.   clindamycin (CLEOCIN) 150 MG capsule  Self Yes Yes   Sig: TAKE FOUR CAPSULES BY MOUTH ONE HOUR BEFORE APPOINTMENT   dorzolamide-timolol (COSOPT) 22.3-6.8 MG/ML ophthalmic solution 5/14/2025 Morning Self Yes Yes   Sig: Place 1 drop into both eyes 2 times daily.   latanoprost (XALATAN) 0.005 % ophthalmic solution 5/13/2025 Evening Self Yes Yes   Sig: INSTILL 1 DROP INTO EACH EYE IN THE EVENING   lisinopril-hydrochlorothiazide (ZESTORETIC) 20-12.5 MG tablet 5/14/2025 Morning Self Yes Yes   Sig: Take 1 tablet by mouth daily.   metoprolol succinate ER (TOPROL XL) 25 MG 24 hr tablet 5/13/2025 Evening Self Yes Yes   Sig: Take 25 mg by mouth daily.   oxyBUTYnin ER (DITROPAN XL) 5 MG 24 hr tablet 5/14/2025 Morning Self Yes Yes   Sig: Take 5 mg by mouth daily.   pilocarpine (PILOCAR) 1 % ophthalmic solution 5/14/2025 Morning  Yes Yes   Sig: Place 1 drop into the right eye 3 times daily.   senna (SENOKOT) 8.6 MG tablet 5/13/2025 Evening  Yes Yes   Sig: Take 1 tablet by mouth every evening.   simvastatin (ZOCOR) 20 MG tablet 5/13/2025 Bedtime Self Yes Yes   Sig: Take 20 mg by mouth daily.   vitamin E 400 UNIT capsule 5/4/2025 Self Yes Yes   Sig: Take 1 capsule by mouth daily.      Facility-Administered Medications: None     Allergies   Allergies   Allergen Reactions    Seafood GI Disturbance and Hives    Shrimp      Other Reaction(s): Extrapyramidal Symptoms    Apixaban Rash    Influenza Virus Vaccine     Amoxicillin Rash     Family History    No family history on file.    Social History   Social History     Socioeconomic History    Marital status:      Physical Exam   /79   Pulse (!) 135   " Temp 98.5  F (36.9  C) (Oral)   Resp 16   Ht 1.676 m (5' 6\")   Wt 72.6 kg (160 lb)   SpO2 93%   BMI 25.82 kg/m       Weight: 160 lbs 0 oz Body mass index is 25.82 kg/m .     Constitutional: Alert, oriented, cooperative, no apparent distress, appears nontoxic  Eyes: Eyes are clear  HENT: Oropharynx is clear and moist. Tongue midline. No evidence of cranial trauma.  Cardiovascular: Tachycardic, irregular rhythm. No obvious murmur. Mildly elevated JVP. Radial pulse 2+. No pitting LE edema.   Respiratory: Unlabored on room air. Mildly coarse in left base, otherwise CTAB remainder of fields bilaterally.   GI: Soft, non-tender, normal bowel sounds, non-distended.   Genitourinary: Deferred  Musculoskeletal: Normal muscle bulk, no obvious joint deformities.   Skin: Warm and dry, no rashes.   Neurologic:  is symmetric. No tremor. Alert & interacting appropriately.     Data   Data reviewed today:     I have personally reviewed the following data over the past 24 hrs:    8.1  \   14.4   / 227     136 100 26.2 (H) /  114 (H)   4.5 23 1.07 (H) \     ALT: 16 AST: 21 AP: 66 TBILI: 0.6   ALB: 4.1 TOT PROTEIN: 6.5 LIPASE: 36     Trop: 9 BNP: N/A     Procal: N/A CRP: N/A Lactic Acid: 1.5         Recent Results (from the past 24 hours)   CT Chest Pulmonary Embolism w Contrast    Narrative    EXAM: CT CHEST PULMONARY EMBOLISM W CONTRAST  LOCATION: [facility]  DATE: 5/14/2025    INDICATION: Off Xarelto for five days preoperatively, generalized weakness, hypotension, tachycardia, evaluate for PE.    COMPARISON: Chest x-ray dated 7/26/2021. CT chest, abdomen and pelvis dated 9/13/2009.    TECHNIQUE: CT chest pulmonary angiogram during arterial phase injection of intravenous contrast. Multiplanar reformats and MIP reconstructions were performed. Dose reduction techniques were used.     CONTRAST: 72 mL Isovue 370.    FINDINGS:  ANGIOGRAM CHEST: Pulmonary arteries are normal caliber and negative for " pulmonary emboli. Thoracic aorta is not well opacified and is  indeterminate for dissection. No CT evidence of right heart strain.    LUNGS AND PLEURA: Interstitial opacities are seen in the left lower lobe. Large airways are patent. A few benign calcified pulmonary granulomas are present. No suspicious-appearing pulmonary nodules or masses. No pleural fluid.    MEDIASTINUM/AXILLAE: Heart size is mildly enlarged. No pericardial fluid. There is mild dilation of the ascending thoracic aorta measuring up to 3.9 cm. Moderate thoracic aortic atherosclerosis is present. The main pulmonary artery is mildly dilated at   3.5 cm, which can be seen with pulmonary artery hypertension in the appropriate clinical setting. A couple prominent to mildly enlarged AP window lymph nodes are present and mildly increased in size to comparison with the largest measuring 15 x 13 mm   (4-91), previously 15 x 11 mm.    CORONARY ARTERY CALCIFICATION: Mild.    UPPER ABDOMEN: There are several scattered hepatic cysts present, which are similar to comparison and do not require follow-up. 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 Mild to moderate atherosclerosis in the imaged abdominal aorta is noted.    MUSCULOSKELETAL: Slight scoliotic curvature of the spine. Suspected bone island in a mid thoracic vertebral body segment is similar to comparison. No follow-up is necessary. No aggressive-appearing lytic or blastic osseous lesions.      Impression    IMPRESSION:  1.  No pulmonary artery embolism.  2.  Interstitial opacities in the left lower lobe which may represent scarring, atelectasis, edema and/or pneumonitis.  3.  Nonspecific, prominent to mildly enlarged AP window lymph nodes.  4.  Mild dilation of the ascending thoracic aorta.  5.  Dilation of the main pulmonary artery, which can be seen with pulmonary artery hypertension in the appropriate  clinical setting.  6.  Mild cardiomegaly.

## 2025-05-14 NOTE — PROGRESS NOTES
Skin affirmation note    Admitting nurse completed full skin assessment, Manjeet score and Manjeet interventions. This writer agrees with the initial skin assessment findings.

## 2025-05-14 NOTE — PLAN OF CARE
"  Neuro: A&Ox4     Cardiac: Upon admission the pt's HR was in the 130's, scheduled oral metoprolol given. Pt converted to SR around 1755. HR is currently in the 60's.    Resp: RA with O2 sats in the mid 90's, Lung sounds diminished to all fields with coarse crackles to the RLL    GI/: WNL    MSK: SBA with cane, steady gait    Skin: Has some dry, flaky skin to her feet and bilateral shins    LDAs: 1 PIV SL      Problem: Dysrhythmia  Goal: Normalized Cardiac Rhythm  Outcome: Not Progressing     Problem: Adult Inpatient Plan of Care  Goal: Plan of Care Review  Description: The Plan of Care Review/Shift note should be completed every shift.  The Outcome Evaluation is a brief statement about your assessment that the patient is improving, declining, or no change.  This information will be displayed automatically on your shiftnote.  Outcome: Progressing  Goal: Patient-Specific Goal (Individualized)  Description: You can add care plan individualizations to a care plan. Examples of Individualization might be:  \"Parent requests to be called daily at 9am for status\", \"I have a hard time hearing out of my right ear\", or \"Do not touch me to wake me up as it startlesme\".  Outcome: Progressing  Goal: Absence of Hospital-Acquired Illness or Injury  Outcome: Progressing  Intervention: Identify and Manage Fall Risk  Recent Flowsheet Documentation  Taken 5/14/2025 1718 by Jeannie Calabrese RN  Safety Promotion/Fall Prevention:   activity supervised   clutter free environment maintained   nonskid shoes/slippers when out of bed   patient and family education   lighting adjusted  Intervention: Prevent Skin Injury  Recent Flowsheet Documentation  Taken 5/14/2025 1718 by Jeannie Calabrese RN  Body Position: position changed independently  Goal: Optimal Comfort and Wellbeing  Outcome: Progressing  Intervention: Monitor Pain and Promote Comfort  Recent Flowsheet Documentation  Taken 5/14/2025 1718 by Jeannie Calabrese" RN  Pain Management Interventions:   care clustered   emotional support   pillow support provided   quiet environment facilitated   rest  Intervention: Provide Person-Centered Care  Recent Flowsheet Documentation  Taken 5/14/2025 1718 by Jeannie Calabrese RN  Trust Relationship/Rapport:   care explained   choices provided   emotional support provided   questions answered   reassurance provided   thoughts/feelings acknowledged  Goal: Readiness for Transition of Care  Outcome: Progressing     Problem: Delirium  Goal: Optimal Coping  Outcome: Progressing  Goal: Improved Behavioral Control  Outcome: Progressing  Intervention: Minimize Safety Risk  Recent Flowsheet Documentation  Taken 5/14/2025 1718 by Jeannie Calabrese RN  Enhanced Safety Measures: room near unit station  Trust Relationship/Rapport:   care explained   choices provided   emotional support provided   questions answered   reassurance provided   thoughts/feelings acknowledged  Goal: Improved Attention and Thought Clarity  Outcome: Progressing  Goal: Improved Sleep  Outcome: Progressing     Problem: Risk for Delirium  Goal: Optimal Coping  Outcome: Progressing  Goal: Improved Behavioral Control  Outcome: Progressing  Intervention: Minimize Safety Risk  Recent Flowsheet Documentation  Taken 5/14/2025 1718 by Jeannie Calabrese RN  Enhanced Safety Measures: room near unit station  Trust Relationship/Rapport:   care explained   choices provided   emotional support provided   questions answered   reassurance provided   thoughts/feelings acknowledged  Goal: Improved Attention and Thought Clarity  Outcome: Progressing  Goal: Improved Sleep  Outcome: Progressing     Problem: UTI (Urinary Tract Infection)  Goal: Improved Infection Symptoms  Outcome: Progressing   Goal Outcome Evaluation:

## 2025-05-14 NOTE — ED PROVIDER NOTES
History     Chief Complaint   Patient presents with    Generalized Weakness    Nausea    Hypotension   Weakness,low BP  HPI  Lolis Pope is a 85 year old female, past medical history is significant for osteoporosis, former smoker, suspected sleep apnea, allergic rhinitis, atrial flutter, stage III kidney disease, hypertension, presents to the emergency department with concerns of lightheadedness/dizziness, weakness nausea and low blood pressure.  History is obtained from the patient who presents with her son with concerns of dizziness (no abnormal sensation of movement, rather lightheadedness) over the last 24-48 hours, feeling weaker than usual, low-grade nausea, poor fluid intake.  Denies URI type symptoms, no UTI type symptoms.  2 episodes of diarrhea where she normally is constipated.  The patient states that she has not been taking fluids very well.  She was preparing to have a knee surgery replacement left knee today but unfortunately she wanted up with a dental extraction that was somewhat unplanned this past Friday when she went in for what was supposed to be a crown and they went up extracting the tooth.  She contacted her surgeon of record yesterday and they canceled her surgery.  The patient had held her Xarelto as directed up until yesterday, 5 days of Xarelto hold, first dose of Xarelto was yesterday on restart.  She has been taking her other medications with the exception of multivitamin and her calcium magnesium supplement.  (She has been taking her beta-blocker which is Toprol-XL 25 mg once a day).  She has not noticed any chest pain or shortness of breath.  No fevers.  Has not noticed her heart being fast and presents with a heart rate of 153 bpm in triage.      Allergies:  Allergies   Allergen Reactions    Seafood GI Disturbance and Hives    Shrimp      Other Reaction(s): Extrapyramidal Symptoms    Apixaban Rash    Influenza Virus Vaccine     Amoxicillin Rash       Problem List:    Patient  Active Problem List    Diagnosis Date Noted    Former smoker 07/30/2024     Priority: Medium    Suspected sleep apnea 08/03/2021     Priority: Medium    Allergic rhinitis 07/26/2021     Priority: Medium    Atrial flutter (H) 07/26/2021     Priority: Medium    CKD (chronic kidney disease) stage 3, GFR 30-59 ml/min (H) 11/18/2016     Priority: Medium    Essential hypertension 06/21/2012     Priority: Medium    Senile nuclear sclerosis 11/03/2010     Priority: Medium        Past Medical History:    Past Medical History:   Diagnosis Date    Osteoporosis        Past Surgical History:    Past Surgical History:   Procedure Laterality Date    APPENDECTOMY      GYN SURGERY      hysterectomy    ORTHOPEDIC SURGERY      tib/fib fx ORIF, R)knee replacement    PHACOEMULSIFICATION WITH STANDARD INTRAOCULAR LENS IMPLANT  11/4/2010    PHACOEMULSIFICATION WITH STANDARD INTRAOCULAR LENS IMPLANT performed by ARLINE MONROE at [facility]    PHACOEMULSIFICATION WITH STANDARD INTRAOCULAR LENS IMPLANT  11/22/2010    PHACOEMULSIFICATION WITH STANDARD INTRAOCULAR LENS IMPLANT performed by ARLINE MONROE at [facility]       Family History:    No family history on file.    Social History:  Marital Status:   [5]  Social History     Tobacco Use    Smoking status: Never   Substance Use Topics    Alcohol use: Yes     Comment: occasional    Drug use: No        Medications:    acetaminophen (TYLENOL) 500 MG tablet  calcium-magnesium (CALMAG) 500-250 MG TABS  cholecalciferol (VITAMIN D) 400 UNIT TABS  citalopram (CELEXA) 10 MG tablet  clindamycin (CLEOCIN) 150 MG capsule  dorzolamide-timolol (COSOPT) 22.3-6.8 MG/ML ophthalmic solution  EPINEPHrine (ANY BX GENERIC EQUIV) 0.3 MG/0.3ML injection 2-pack  latanoprost (XALATAN) 0.005 % ophthalmic solution  lisinopril-hydrochlorothiazide (ZESTORETIC) 20-12.5 MG tablet  Melatonin 300 MCG TABS  metoprolol succinate ER (TOPROL XL) 25 MG 24 hr tablet  oxyBUTYnin ER (DITROPAN XL) 5 MG 24 hr  "tablet  pilocarpine (PILOCAR) 1 % ophthalmic solution  senna (SENOKOT) 8.6 MG tablet  simvastatin (ZOCOR) 20 MG tablet  vitamin E 400 UNIT capsule  XARELTO ANTICOAGULANT 20 MG TABS tablet          Review of Systems   All other systems reviewed and are negative.      Physical Exam   BP: (!) 132/120 (right arm)  Pulse: (!) 146  Temp: 98.5  F (36.9  C)  Resp: 16  Height: 167.6 cm (5' 6\")  Weight: 72.6 kg (160 lb)  SpO2: 96 %      Physical Exam  Vitals and nursing note reviewed.   Constitutional:       General: She is not in acute distress.     Appearance: Normal appearance. She is normal weight. She is not ill-appearing.   HENT:      Head: Normocephalic and atraumatic.      Right Ear: Tympanic membrane, ear canal and external ear normal.      Left Ear: Tympanic membrane, ear canal and external ear normal.      Nose: Nose normal.      Mouth/Throat:      Mouth: Mucous membranes are dry.      Pharynx: Oropharynx is clear.   Eyes:      Extraocular Movements: Extraocular movements intact.      Conjunctiva/sclera: Conjunctivae normal.      Pupils: Pupils are equal, round, and reactive to light.   Cardiovascular:      Rate and Rhythm: Tachycardia present. Rhythm irregular.      Pulses: Normal pulses.      Heart sounds: Normal heart sounds.   Pulmonary:      Effort: Pulmonary effort is normal.      Breath sounds: Normal breath sounds.   Abdominal:      General: Bowel sounds are normal.      Palpations: Abdomen is soft.   Musculoskeletal:         General: Normal range of motion.      Cervical back: Normal range of motion and neck supple.   Skin:     General: Skin is warm and dry.      Capillary Refill: Capillary refill takes less than 2 seconds.   Neurological:      General: No focal deficit present.      Mental Status: She is alert and oriented to person, place, and time.   Psychiatric:         Mood and Affect: Mood normal.         Behavior: Behavior normal.         ED Course        Procedures              EKG Interpretation:  "     Interpreted by Garth Gregorio MD  Time reviewed: Time obtained 1031 time interpreted same 147 bpm left axis deviation, left bundle branch block, wide QRS tachycardia.      Results for orders placed or performed during the hospital encounter of 05/14/25 (from the past 24 hours)   EKG 12-lead, tracing only   Result Value Ref Range    Systolic Blood Pressure  mmHg    Diastolic Blood Pressure  mmHg    Ventricular Rate 147 BPM    Atrial Rate  BPM    MD Interval  ms    QRS Duration 122 ms     ms    QTc 525 ms    P Axis  degrees    R AXIS -39 degrees    T Axis 106 degrees    Interpretation ECG       Wide QRS tachycardia  Left axis deviation  Left bundle branch block  Abnormal ECG  No previous ECGs available     CBC with platelets, differential    Narrative    The following orders were created for panel order CBC with platelets, differential.  Procedure                               Abnormality         Status                     ---------                               -----------         ------                     CBC with platelets and ...[[id number]]                      Final result                 Please view results for these tests on the individual orders.   Comprehensive metabolic panel   Result Value Ref Range    Sodium 136 135 - 145 mmol/L    Potassium 4.5 3.4 - 5.3 mmol/L    Carbon Dioxide (CO2) 23 22 - 29 mmol/L    Anion Gap 13 7 - 15 mmol/L    Urea Nitrogen 26.2 (H) 8.0 - 23.0 mg/dL    Creatinine 1.07 (H) 0.51 - 0.95 mg/dL    GFR Estimate 50 (L) >60 mL/min/1.73m2    Calcium 9.7 8.8 - 10.4 mg/dL    Chloride 100 98 - 107 mmol/L    Glucose 114 (H) 70 - 99 mg/dL    Alkaline Phosphatase 66 40 - 150 U/L    AST 21 0 - 45 U/L    ALT 16 0 - 50 U/L    Protein Total 6.5 6.4 - 8.3 g/dL    Albumin 4.1 3.5 - 5.2 g/dL    Bilirubin Total 0.6 <=1.2 mg/dL   Troponin T, High Sensitivity   Result Value Ref Range    Troponin T, High Sensitivity 13 <=14 ng/L   Lipase   Result Value Ref Range    Lipase 36 13 - 60 U/L    Lactic Acid Whole Blood with 1X Repeat in 2 HR when >2   Result Value Ref Range    Lactic Acid, Initial 1.5 0.7 - 2.0 mmol/L   CBC with platelets and differential   Result Value Ref Range    WBC Count 8.1 4.0 - 11.0 10e3/uL    RBC Count 4.60 3.80 - 5.20 10e6/uL    Hemoglobin 14.4 11.7 - 15.7 g/dL    Hematocrit 43.1 35.0 - 47.0 %    MCV 94 78 - 100 fL    MCH 31.3 26.5 - 33.0 pg    MCHC 33.4 31.5 - 36.5 g/dL    RDW 12.8 10.0 - 15.0 %    Platelet Count 227 150 - 450 10e3/uL    % Neutrophils 68 %    % Lymphocytes 23 %    % Monocytes 7 %    % Eosinophils 2 %    % Basophils 0 %    % Immature Granulocytes 0 %    NRBCs per 100 WBC 0 <1 /100    Absolute Neutrophils 5.5 1.6 - 8.3 10e3/uL    Absolute Lymphocytes 1.9 0.8 - 5.3 10e3/uL    Absolute Monocytes 0.5 0.0 - 1.3 10e3/uL    Absolute Eosinophils 0.1 0.0 - 0.7 10e3/uL    Absolute Basophils 0.0 0.0 - 0.2 10e3/uL    Absolute Immature Granulocytes 0.0 <=0.4 10e3/uL    Absolute NRBCs 0.0 10e3/uL   Influenza A/B, RSV and SARS-CoV2 PCR (COVID-19) Nose    Specimen: Nose; Swab   Result Value Ref Range    Influenza A PCR Negative Negative    Influenza B PCR Negative Negative    RSV PCR Negative Negative    SARS CoV2 PCR Negative Negative    Narrative    Testing was performed using the Xpert Xpress CoV2/Flu/RSV Assay on the [employer] GeneXpert Instrument. This test should be ordered for the detection of SARS-CoV2, influenza, and RSV viruses in individuals with signs and symptoms of respiratory tract infection. This test is for in vitro diagnostic use under the [place] FDA for laboratories certified under CLIA to perform high or moderate complexity testing. This test has been [place] FDA cleared. A negative result does not rule out the presence of PCR inhibitors in the specimen or target RNA in concentration below the limit of detection for the assay. If only one viral target is positive but coinfection with multiple targets is suspected, the sample should be re-tested with another FDA  cleared, approved, or authorized test, if coninfection would change clinical management. This test was validated by the [facility]. These laboratories are certified under the Clinical Laboratory Improvement Amendments of 1988 (CLIA-88) as qualified to perfom high complexity laboratory testing.   UA with Microscopic reflex to Culture    Specimen: Urine, Midstream   Result Value Ref Range    Color Urine Yellow Colorless, Straw, Light Yellow, Yellow    Appearance Urine Slightly Cloudy (A) Clear    Glucose Urine Negative Negative mg/dL    Bilirubin Urine Negative Negative    Ketones Urine Negative Negative mg/dL    Specific Gravity Urine 1.025 1.003 - 1.035    Blood Urine Negative Negative    pH Urine 5.0 5.0 - 7.0    Protein Albumin Urine 20 (A) Negative mg/dL    Urobilinogen Urine 2.0 (A) Normal mg/dL    Nitrite Urine Negative Negative    Leukocyte Esterase Urine Large (A) Negative    Bacteria Urine Moderate (A) None Seen /HPF    Mucus Urine Present (A) None Seen /LPF    RBC Urine 6 (H) <=2 /HPF    WBC Urine 95 (H) <=5 /HPF    Squamous Epithelials Urine 2 (H) <=1 /HPF    Hyaline Casts Urine 19 (H) <=2 /LPF    Narrative    Urine Culture ordered based on laboratory criteria   Troponin T, High Sensitivity   Result Value Ref Range    Troponin T, High Sensitivity 9 <=14 ng/L   CT Chest Pulmonary Embolism w Contrast    Narrative    EXAM: CT CHEST PULMONARY EMBOLISM W CONTRAST  LOCATION: [facility]  DATE: 5/14/2025    INDICATION: Off Xarelto for five days preoperatively, generalized weakness, hypotension, tachycardia, evaluate for PE.    COMPARISON: Chest x-ray dated 7/26/2021. CT chest, abdomen and pelvis dated 9/13/2009.    TECHNIQUE: CT chest pulmonary angiogram during arterial phase injection of intravenous contrast. Multiplanar reformats and MIP reconstructions were performed. Dose reduction techniques were used.     CONTRAST: 72 mL Isovue 370.    FINDINGS:  ANGIOGRAM  CHEST: Pulmonary arteries are normal caliber and negative for pulmonary emboli. Thoracic aorta is not well opacified and is  indeterminate for dissection. No CT evidence of right heart strain.    LUNGS AND PLEURA: Interstitial opacities are seen in the left lower lobe. Large airways are patent. A few benign calcified pulmonary granulomas are present. No suspicious-appearing pulmonary nodules or masses. No pleural fluid.    MEDIASTINUM/AXILLAE: Heart size is mildly enlarged. No pericardial fluid. There is mild dilation of the ascending thoracic aorta measuring up to 3.9 cm. Moderate thoracic aortic atherosclerosis is present. The main pulmonary artery is mildly dilated at   3.5 cm, which can be seen with pulmonary artery hypertension in the appropriate clinical setting. A couple prominent to mildly enlarged AP window lymph nodes are present and mildly increased in size to comparison with the largest measuring 15 x 13 mm   (4-91), previously 15 x 11 mm.    CORONARY ARTERY CALCIFICATION: Mild.    UPPER ABDOMEN: There are several scattered hepatic cysts present, which are similar to comparison and do not require follow-up. 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 Mild to moderate atherosclerosis in the imaged abdominal aorta is noted.    MUSCULOSKELETAL: Slight scoliotic curvature of the spine. Suspected bone island in a mid thoracic vertebral body segment is similar to comparison. No follow-up is necessary. No aggressive-appearing lytic or blastic osseous lesions.      Impression    IMPRESSION:  1.  No pulmonary artery embolism.  2.  Interstitial opacities in the left lower lobe which may represent scarring, atelectasis, edema and/or pneumonitis.  3.  Nonspecific, prominent to mildly enlarged AP window lymph nodes.  4.  Mild dilation of the ascending thoracic aorta.  5.  Dilation of the main pulmonary artery, which can be  seen with pulmonary artery hypertension in the appropriate clinical setting.  6.  Mild cardiomegaly.         Medications   metoprolol (LOPRESSOR) injection 5 mg (has no administration in time range)   sodium chloride 0.9% BOLUS 1,000 mL (0 mLs Intravenous Stopped 5/14/25 1220)   ondansetron (ZOFRAN) injection 4 mg (4 mg Intravenous $Given 5/14/25 1048)   sodium chloride 0.9% BOLUS 1,000 mL (0 mLs Intravenous Stopped 5/14/25 1324)   cefTRIAXone (ROCEPHIN) 2 g vial to attach to  ml bag for ADULTS or NS 50 ml bag for PEDS (0 g Intravenous Stopped 5/14/25 1324)   iopamidol (ISOVUE-370) solution 72 mL (72 mLs Intravenous $Given 5/14/25 1333)   sodium chloride 0.9 % bag for CT scan flush (98 mLs Intravenous $Given 5/14/25 1333)   3:17 PM  Reviewed all lab diagnostics and imaging in the room with the patient and her son and answered their questions.  The patient feels significantly better after IV fluids but this did not really significantly change her heart rate and her blood pressure has been somewhat soft at times down to around 90 systolic although currently is at 112/79.  Heart rate is still in the 140s A-fib RVR.  As noted the patient only held her DOAC and her multivitamins and did not hold her beta-blocker which she has been taking as previously directed so the etiology in terms of the preop preparation for her presentation seems a little less likely.  Her urine without any urinary tract symptoms does appear infected and I have treated this with Rocephin IV.  Her CT of her chest thankfully does not demonstrate any pulmonary embolism which was my chief concern given her Xarelto hold perioperatively for the surgery that was ultimately canceled.  She could have some transient bacteremia from the dental extraction but did receive antibiotics around that event.  Her CT chest that while not demonstrating pulmonary emboli does demonstrate interstitial opacities in left lower lobe with appropriate differential  entertained by radiology.  Certainly this could be a focus.  Given that her pressure is now a little bit better I am going to try giving her some IV metoprolol for better rate control.  The patient and her son agree with plan for admitting her to observation here.  I spoke with her hospitalist here Shama for Dr. Dahl who agreed to accept the patient to an [facility] bed.      Assessments & Plan (with Medical Decision Making)   Assessment and plan with medical decision making at the time stamp above.      Disclaimer: This note consists of symbols derived from keyboarding, dictation and/or voice recognition software. As a result, there may be errors in the script that have gone undetected. Please consider this when interpreting information found in this chart.      I have reviewed the nursing notes.    I have reviewed the findings, diagnosis, plan and need for follow up with the patient.          New Prescriptions    No medications on file       Final diagnoses:   UTI (urinary tract infection), bacterial   Atrial fibrillation with RVR (H)   Transient hypotension       5/14/2025   [facility] EMERGENCY DEPT       Garth Gregorio MD  05/14/25 [id number]

## 2025-05-14 NOTE — PROGRESS NOTES
"[facility] ADMISSION NOTE    Patient admitted to room 4 at approximately 1705 via cart from emergency room. Patient was accompanied by son.     Verbal SBAR report received from Latia VU prior to patient arrival.     Patient ambulated to bed with stand-by assist. Patient alert and oriented X 3. The patient is not having any pain.  . Admission vital signs: Blood pressure 119/77, pulse (!) 127, temperature 98.2  F (36.8  C), temperature source Oral, resp. rate 18, height 1.676 m (5' 6\"), weight 74.5 kg (164 lb 3.9 oz), SpO2 94%. Patient was oriented to plan of care, call light, bed controls, tv, telephone, bathroom, and visiting hours.     Risk Assessment    The following safety risks were identified during admission: fall. Yellow risk band applied: YES.     Skin Initial Assessment    This writer admitted this patient and completed a full skin assessment and Manjeet score in the Adult PCS flowsheet.   Photo documentation of skin problem and/or wound competed via [employer] application (located under Media):  N/A    Appropriate interventions initiated as needed.     Secondary skin check completed by Mary Vu.    Manjeet Risk Assessment  Sensory Perception: 4-->no impairment  Moisture: 4-->rarely moist  Activity: 3-->walks occasionally  Mobility: 4-->no limitation  Nutrition: 3-->adequate  Friction and Shear: 3-->no apparent problem  Manjeet Score: 21  Mattress: Low air loss (MAXI pump, Isolibrium, Skin Guard, Pulsate, Isoair, etc.)  Bed Frame: Standard width and length    Education    Patient has a [place] to Observation order: Yes  Observation education completed and documented: Yes in ED      Jeannie Calabrese RN      "

## 2025-05-14 NOTE — MEDICATION SCRIBE - ADMISSION MEDICATION HISTORY
Medication Scribe Admission Medication History    Admission medication history is complete. The information provided in this note is only as accurate as the sources available at the time of the update.    Information Source(s): Patient via in-person    Pertinent Information: pt was scheduled for knee surgery today. I believe some of the meds to be reconciled were ordered for her surgery. I left them there.    Changes made to PTA medication list:  Added: citalopram, latanoprost, lisinopril/hydrochlorothiazide, metoprolol, oxybutynin, xarelto, simvastatin, epi pen, tylenol, senna, melatonin, pilocarpine  Deleted: alendronate, asa, fish oil, folic acid, travoprost   Changed: None    Allergies reviewed with patient and updates made in EHR: yes    Medication History Completed By: Yaritza Ho 5/14/2025 3:10 PM    PTA Med List   Medication Sig Note Last Dose/Taking    acetaminophen (TYLENOL) 500 MG tablet Take 1 tablet by mouth 3 times daily.  5/14/2025 Morning    calcium-magnesium (CALMAG) 500-250 MG TABS Take 1 tablet by mouth 2 times daily (with meals).  5/4/2025    cholecalciferol (VITAMIN D) 400 UNIT TABS Take 400 Units by mouth daily.  5/4/2025    citalopram (CELEXA) 10 MG tablet Take 10 mg by mouth daily.  5/13/2025 Morning    clindamycin (CLEOCIN) 150 MG capsule TAKE FOUR CAPSULES BY MOUTH ONE HOUR BEFORE APPOINTMENT  Taking    dorzolamide-timolol (COSOPT) 22.3-6.8 MG/ML ophthalmic solution Place 1 drop into both eyes 2 times daily.  5/14/2025 Morning    EPINEPHrine (ANY BX GENERIC EQUIV) 0.3 MG/0.3ML injection 2-pack INJECT CONTENTS OF 1 PEN AS NEEDED FOR ALLERGIC REACTION AS NEEDED. MAY REPEAT 5/14/2025: Has on hand Taking    latanoprost (XALATAN) 0.005 % ophthalmic solution INSTILL 1 DROP INTO EACH EYE IN THE EVENING  5/13/2025 Evening    lisinopril-hydrochlorothiazide (ZESTORETIC) 20-12.5 MG tablet Take 1 tablet by mouth daily.  5/14/2025 Morning    Melatonin 300 MCG TABS Take 1 tablet by mouth at  bedtime.  5/13/2025 Bedtime    metoprolol succinate ER (TOPROL XL) 25 MG 24 hr tablet Take 25 mg by mouth daily.  5/13/2025 Evening    oxyBUTYnin ER (DITROPAN XL) 5 MG 24 hr tablet Take 5 mg by mouth daily.  5/14/2025 Morning    pilocarpine (PILOCAR) 1 % ophthalmic solution Place 1 drop into the right eye 3 times daily.  5/14/2025 Morning    senna (SENOKOT) 8.6 MG tablet Take 1 tablet by mouth every evening.  5/13/2025 Evening    simvastatin (ZOCOR) 20 MG tablet Take 20 mg by mouth daily.  5/13/2025 Bedtime    vitamin E 400 UNIT capsule Take 1 capsule by mouth daily.  5/4/2025    XARELTO ANTICOAGULANT 20 MG TABS tablet Take 20 mg by mouth every evening.  5/13/2025 Evening

## 2025-05-14 NOTE — ED TRIAGE NOTES
Patient had tooth extraction on Friday. Patient had been off Xarelto for 5 days. Patient was to have her knee replaced but did not due to the extraction. 1st dose of Xarelto was yesterday. Weakness, dizziness and nausea present. Hx of atrial fib.      Triage Assessment (Adult)       Row Name 05/14/25 1017          Triage Assessment    Airway WDL WDL        Respiratory WDL    Respiratory WDL WDL        Skin Circulation/Temperature WDL    Skin Circulation/Temperature WDL WDL        Cardiac WDL    Cardiac WDL X;rhythm     Pulse Rate & Regularity radial pulse irregular     Cardiac Rhythm Atrial fibrillation        Peripheral/Neurovascular WDL    Peripheral Neurovascular WDL WDL        Cognitive/Neuro/Behavioral WDL    Cognitive/Neuro/Behavioral WDL WDL

## 2025-05-15 VITALS
SYSTOLIC BLOOD PRESSURE: 103 MMHG | DIASTOLIC BLOOD PRESSURE: 69 MMHG | WEIGHT: 162.26 LBS | HEART RATE: 60 BPM | HEIGHT: 66 IN | TEMPERATURE: 98.3 F | RESPIRATION RATE: 18 BRPM | BODY MASS INDEX: 26.08 KG/M2 | OXYGEN SATURATION: 91 %

## 2025-05-15 LAB
ANION GAP SERPL CALCULATED.3IONS-SCNC: 8 MMOL/L (ref 7–15)
BACTERIA UR CULT: ABNORMAL
BUN SERPL-MCNC: 22 MG/DL (ref 8–23)
CALCIUM SERPL-MCNC: 9.1 MG/DL (ref 8.8–10.4)
CHLORIDE SERPL-SCNC: 104 MMOL/L (ref 98–107)
CREAT SERPL-MCNC: 1.01 MG/DL (ref 0.51–0.95)
EGFRCR SERPLBLD CKD-EPI 2021: 54 ML/MIN/1.73M2
GLUCOSE SERPL-MCNC: 97 MG/DL (ref 70–99)
HCO3 SERPL-SCNC: 25 MMOL/L (ref 22–29)
HOLD SPECIMEN: NORMAL
MAGNESIUM SERPL-MCNC: 2.5 MG/DL (ref 1.7–2.3)
POTASSIUM SERPL-SCNC: 4.6 MMOL/L (ref 3.4–5.3)
SODIUM SERPL-SCNC: 137 MMOL/L (ref 135–145)

## 2025-05-15 PROCEDURE — 83735 ASSAY OF MAGNESIUM: CPT

## 2025-05-15 PROCEDURE — 250N000011 HC RX IP 250 OP 636: Performed by: STUDENT IN AN ORGANIZED HEALTH CARE EDUCATION/TRAINING PROGRAM

## 2025-05-15 PROCEDURE — 96376 TX/PRO/DX INJ SAME DRUG ADON: CPT

## 2025-05-15 PROCEDURE — 99239 HOSP IP/OBS DSCHRG MGMT >30: CPT | Performed by: STUDENT IN AN ORGANIZED HEALTH CARE EDUCATION/TRAINING PROGRAM

## 2025-05-15 PROCEDURE — 80048 BASIC METABOLIC PNL TOTAL CA: CPT

## 2025-05-15 PROCEDURE — 250N000013 HC RX MED GY IP 250 OP 250 PS 637

## 2025-05-15 PROCEDURE — G0378 HOSPITAL OBSERVATION PER HR: HCPCS

## 2025-05-15 PROCEDURE — 36415 COLL VENOUS BLD VENIPUNCTURE: CPT

## 2025-05-15 RX ORDER — CEPHALEXIN 500 MG/1
500 CAPSULE ORAL 2 TIMES DAILY
Qty: 6 CAPSULE | Refills: 0 | Status: SHIPPED | OUTPATIENT
Start: 2025-05-15 | End: 2025-05-18

## 2025-05-15 RX ORDER — LISINOPRIL 20 MG/1
30 TABLET ORAL DAILY
Qty: 45 TABLET | Refills: 0 | Status: SHIPPED | OUTPATIENT
Start: 2025-05-15 | End: 2025-06-14

## 2025-05-15 RX ORDER — CEFTRIAXONE 2 G/1
2 INJECTION, POWDER, FOR SOLUTION INTRAMUSCULAR; INTRAVENOUS EVERY 24 HOURS
Status: COMPLETED | OUTPATIENT
Start: 2025-05-15 | End: 2025-05-15

## 2025-05-15 RX ADMIN — LISINOPRIL AND HYDROCHLOROTHIAZIDE 1 TABLET: 12.5; 2 TABLET ORAL at 08:08

## 2025-05-15 RX ADMIN — CEFTRIAXONE 2 G: 2 INJECTION, POWDER, FOR SOLUTION INTRAMUSCULAR; INTRAVENOUS at 09:41

## 2025-05-15 RX ADMIN — METOPROLOL SUCCINATE 25 MG: 25 TABLET, EXTENDED RELEASE ORAL at 08:08

## 2025-05-15 RX ADMIN — DORZOLAMIDE HYDROCHLORIDE AND TIMOLOL MALEATE 1 DROP: 20; 5 SOLUTION/ DROPS OPHTHALMIC at 08:10

## 2025-05-15 RX ADMIN — CITALOPRAM HYDROBROMIDE 10 MG: 10 TABLET ORAL at 08:08

## 2025-05-15 ASSESSMENT — ACTIVITIES OF DAILY LIVING (ADL)
ADLS_ACUITY_SCORE: 51
ADLS_ACUITY_SCORE: 53
ADLS_ACUITY_SCORE: 51

## 2025-05-15 NOTE — PROGRESS NOTES
[facility] DISCHARGE NOTE    Patient discharged to home at 11:02 AM via wheel chair. Accompanied by son and staff. Discharge instructions reviewed with patient and son, opportunity offered to ask questions. Prescriptions sent to patients preferred pharmacy. All belongings sent with patient.    Jeannie Calabrese RN

## 2025-05-15 NOTE — PLAN OF CARE
DATE & TIME: 7P-7A                    Cognitive Concerns/ Orientation : A&O X4  BEHAVIOR & AGGRESSION TOOL COLOR: green  ABNL VS/O2: room air  MOBILITY: up w/SBA and cane   PAIN MANAGEMENT: tylenol PRN  DIET: regular  BOWEL/BLADDER: continent of B&B  DRAIN/DEVICES: 1 PIV  TELEMETRY RHYTHM: normal sinus & Afib    OTHER IMPORTANT INFO:     Noted to be fluctuating back and forth between sinus rhythm and AFIB. Sinus rates priscila in the 50s, Afib rates 70s-80s. Bps soft but stable. Up to bathroom w/SBA- pt reported one episode of dizziness. Tolerating oral intake.     Karen Jackson RN on 5/15/2025 at 5:36 AM

## 2025-05-15 NOTE — DISCHARGE SUMMARY
"[facility]  Hospitalist Discharge Summary      Date of Admission:  5/14/2025  Date of Discharge:  5/15/2025  Discharging Provider: Anoop Méndez DO  Discharge Service: Hospitalist Service    Discharge Diagnoses   Lolis Pope is a 85 year old female with past medical history of CKD3, atrial fibrillation on anticoagulation currently on hold for pre-planned outpatient surgery, sleep apnea, hypertension, former tobacco user now presents on 5/14/2025 with nausea, weakness, and hypotension. She is being admitted for atrial flutter with RVR & transient hypotension plus UTI. Patient spontaneously converted to sinus rhythm during infusion of IV magnesium (sMg 1.5) and maintained NSR without additional rate control meds. Patient was discharged with EP referral for CTI-ablation consideration and with her bp meds adjusted to reduce  risk of electrolyte derangements and dehydration.      Atrial flutter / fib with RVR  Hypotension  Heart failure considered and ruled out  Urinary tract infection, bacterial (E. Coli)  Chronic urinary urgency & incontinence  Hypomagnesemia  Knee osteoarthritis  Essential hypertension  CKD (chronic kidney disease) stage 3  Obstructive sleep apnea    Clinically Significant Risk Factors     # Overweight: Estimated body mass index is 26.19 kg/m  as calculated from the following:    Height as of this encounter: 1.676 m (5' 6\").    Weight as of this encounter: 73.6 kg (162 lb 4.1 oz).       Follow-ups Needed After Discharge   Follow-up Appointments       Hospital Follow-up with Existing Primary Care Provider (PCP)          Schedule Primary Care visit within: 14 Days   Recommended labs and Imaging (to be ordered by Primary Care Provider): tte 1-2 months, recheck magnesium and potassium in 1-2 weeks               Unresulted Labs Ordered in the Past 30 Days of this Admission       Date and Time Order Name Status Description    5/14/2025 11:45 AM Urine Culture Preliminary       " "  These results will be followed up by PCP (E. Coli pre-doyle, 2 doses IV rocephin, transitioned to PO keflex on discharge)    Discharge Disposition   Discharged to home  Condition at discharge: Fair    Hospital Course   Lolis Pope is a 85 year old female with past medical history of CKD3, atrial fibrillation on anticoagulation currently on hold for pre-planned outpatient surgery, sleep apnea, hypertension, former tobacco user now presents on 5/14/2025 with nausea, weakness, and hypotension. She is being admitted for afib with RVR & transient hypotension plus UTI.     Atrial flutter / fib with RVR  (CTI-Depdent, atypical/clockwise flutter)  Hypotension    Presents feeling generally weak, run down, mildly lightheaded since 5/11. No chest pain or syncope. Checked BP AM 5/14 due to persistent \"off\" feeling & noticed it was low. Notably, has history of atrial flutter requiring cardioversion 7/26/2021.     On presentation to ER she is tachycardic ('s) and became hypotensive (BP 69/56). Received 2L IVF with minimal response in HR & slight improvement in BP (SBP 90's). EKG shows wide-complex tachycardia appearing like possible flutter? Troponin negative (13 ?  9). S/p 4g Mg sulfate in ER (initially Mg 1.5).     Trialing Lopressor in ER pending ongoing BP stability. Notably, patient has been holding Rivaroxaban for knee surgery scheduled 5/14 (cancelled) and just resumed 5/13. CT PE study negative for PE.     - Metoprolol tartrate IV 5mg Q15 min PRN x3 doses for heart rate >100bpm; holding for SBP <90  - Continuous telemetry  - Continue PTA metoprolol succinate from 25mg daily for now. Consider increase if BP allows.   - Continue PTA Rivaroxaban 20mg daily   - EP referral placed in discharge navigator    Heart failure considered and ruled out    Presents with atrial flutter with RVR & hypotension. On admission exam she has mild JVD, no LE edema. CT PE study shows left lower lobe interstitial opacities which could " be scarring vs edema / pneumonitis, and mild dilation of main pulmonary artery which can be seen with pulm HTN.     Lexiscan in 2014 was WNL with EF 68%. ECHO 7/2021 LVEF 50% with mild-moderate RV systolic dysfunction & bi-atrial enlargement.     Clinically could be in mild HF 2/2 increased rates with a-flutter (above). Despite this, she tolerated 2L IVF in ER rather well.     Suspect the 'jvd' was 'frog sign' with JV distension related to atrial contraction against a close tricuspid valve causing the appearance of increased right heart pressures on exam. No JVD was noted on day of discharge after correction of atrial arrhythmia.    Urinary tract infection, bacterial  Chronic urinary urgency & incontinence    Mild darkening in urine coloration; chronic urgency & incontinence is unchanged. No flank or suprapubic pain.     UA suggestive of UTI: slightly cloudy, large leuk esterase, 95 WBC, moderate bacteria, 6 RBC, mucus, and 19 hyaline casts. Generally poor sample with 2 squamous cells.     No leukocytosis (WBC 8.1). Lactic acid WNL (1.5). Not septic.    Patient has mild UTI; infxn could be contributing etiology to flutter with RVR, above.   - Ceftriaxone 2g IV Q24hrs   - Urine culture 5/14 NGTD  - Continue PTA oxybutynin 5mg daily     Hypomagnesemia  Mg 1.5 on presentation. K >4.   - Mag sulfate 4g IV one time  - Recheck Mg in AM    Knee osteoarthritis  Regarding rescheduling knee arthroplasty, would consider rescheduling >4wks out given patient's conversion to sinus rhythm after admission.    Essential hypertension    Intermittently hypotensive since presentation 2/2 afib with RVR.   - discontinued combo acei/hydrochlorothiazide and changed to 30mg lisinopril monotherapy (was 20mg lisinopril with hydrochlorothiazide, stopped due to patient endorsing dehydration and poor thirst drive. Also with electrolyte abnormalities causing atrial arrhythmia    CKD (chronic kidney disease) stage 3  Baseline creatinine around  1.0, creatinine on presentation 1.07, GFR 50.     Obstructive sleep apnea  Referred after episode of atrial flutter in 2021. Sleep study showed ESTELA. Was prescribed PAP therapy, unclear if she ever started this.     Consultations This Hospital Stay   None    Code Status   Full Code    Time Spent on this Encounter   IAnoop DO, personally saw the patient today and spent greater than 30 minutes discharging this patient.       Anoop Méndez DO  [facility] INTENSIVE CARE  [address] [place] [id number]  Phone: [phone]  Fax: [phone]  ______________________________________________________________________    Physical Exam   Vital Signs: Temp: 98.3  F (36.8  C) Temp src: Oral BP: 103/69 Pulse: 64   Resp: 18 SpO2: (!) 91 % O2 Device: None (Room air)    Weight: 162 lbs 4.14 oz  Physical Exam  Constitutional:       General: Pt is not in acute distress.  HENT:      Head: Normocephalic and atraumatic.      Nose: Nose normal.   Eyes:      Conjunctiva/sclera: Conjunctivae normal.   Pulmonary:      Effort: Pulmonary effort is normal. No rales.  Cardiac: RRR, S1/S2, no extra beats, no S3/4. Rates about 60-70.  Abdominal:      General: Abdomen is flat.   Skin:     Findings: No rash.   Neurological:      General: No focal deficit present.      Mental Status: Pt is alert.   Psychiatric:         Mood and Affect: Mood normal.          Primary Care Physician   Snehal Napoles    Discharge Orders      Adult Cardiology Gloria Trammell Referral      Reason for your hospital stay    You were hospitalized for fatigue related to your fast heart rate (atrial flutter).     Activity    Your activity upon discharge: activity as tolerated     Diet    Follow this diet upon discharge: Current Diet:Orders Placed This Encounter      Combination Diet 2 gm NA Diet; Low Saturated Fat Na <2400mg Diet     Hospital Follow-up with Existing Primary Care Provider (PCP)            Significant Results and Procedures   Results  for orders placed or performed during the hospital encounter of 05/14/25   CT Chest Pulmonary Embolism w Contrast    Narrative    EXAM: CT CHEST PULMONARY EMBOLISM W CONTRAST  LOCATION: [facility]  DATE: 5/14/2025    INDICATION: Off Xarelto for five days preoperatively, generalized weakness, hypotension, tachycardia, evaluate for PE.    COMPARISON: Chest x-ray dated 7/26/2021. CT chest, abdomen and pelvis dated 9/13/2009.    TECHNIQUE: CT chest pulmonary angiogram during arterial phase injection of intravenous contrast. Multiplanar reformats and MIP reconstructions were performed. Dose reduction techniques were used.     CONTRAST: 72 mL Isovue 370.    FINDINGS:  ANGIOGRAM CHEST: Pulmonary arteries are normal caliber and negative for pulmonary emboli. Thoracic aorta is not well opacified and is  indeterminate for dissection. No CT evidence of right heart strain.    LUNGS AND PLEURA: Interstitial opacities are seen in the left lower lobe. Large airways are patent. A few benign calcified pulmonary granulomas are present. No suspicious-appearing pulmonary nodules or masses. No pleural fluid.    MEDIASTINUM/AXILLAE: Heart size is mildly enlarged. No pericardial fluid. There is mild dilation of the ascending thoracic aorta measuring up to 3.9 cm. Moderate thoracic aortic atherosclerosis is present. The main pulmonary artery is mildly dilated at   3.5 cm, which can be seen with pulmonary artery hypertension in the appropriate clinical setting. A couple prominent to mildly enlarged AP window lymph nodes are present and mildly increased in size to comparison with the largest measuring 15 x 13 mm   (4-91), previously 15 x 11 mm.    CORONARY ARTERY CALCIFICATION: Mild.    UPPER ABDOMEN: There are several scattered hepatic cysts present, which are similar to comparison and do not require follow-up. A round, peripherally calcified 9-10 mm splenic artery aneurysm is seen near the hilum of the spleen,  which is similar to   comparison. Mild to moderate atherosclerosis in the imaged abdominal aorta is noted.    MUSCULOSKELETAL: Slight scoliotic curvature of the spine. Suspected bone island in a mid thoracic vertebral body segment is similar to comparison. No follow-up is necessary. No aggressive-appearing lytic or blastic osseous lesions.      Impression    IMPRESSION:  1.  No pulmonary artery embolism.  2.  Interstitial opacities in the left lower lobe which may represent scarring, atelectasis, edema and/or pneumonitis.  3.  Nonspecific, prominent to mildly enlarged AP window lymph nodes.  4.  Mild dilation of the ascending thoracic aorta.  5.  Dilation of the main pulmonary artery, which can be seen with pulmonary artery hypertension in the appropriate clinical setting.  6.  Mild cardiomegaly.         Discharge Medications   Current Discharge Medication List        START taking these medications    Details   cephALEXin (KEFLEX) 500 MG capsule Take 1 capsule (500 mg) by mouth 2 times daily for 3 days.  Qty: 6 capsule, Refills: 0    Comments: First dose 5/16 (received IV rocephin 5/15)  Associated Diagnoses: UTI (urinary tract infection), bacterial      lisinopril (ZESTRIL) 20 MG tablet Take 1.5 tablets (30 mg) by mouth daily.  Qty: 45 tablet, Refills: 0    Associated Diagnoses: Essential hypertension           CONTINUE these medications which have NOT CHANGED    Details   acetaminophen (TYLENOL) 500 MG tablet Take 1 tablet by mouth 3 times daily.      calcium-magnesium (CALMAG) 500-250 MG TABS Take 1 tablet by mouth 2 times daily (with meals).      cholecalciferol (VITAMIN D) 400 UNIT TABS Take 400 Units by mouth daily.      citalopram (CELEXA) 10 MG tablet Take 10 mg by mouth daily.      clindamycin (CLEOCIN) 150 MG capsule TAKE FOUR CAPSULES BY MOUTH ONE HOUR BEFORE APPOINTMENT      dorzolamide-timolol (COSOPT) 22.3-6.8 MG/ML ophthalmic solution Place 1 drop into both eyes 2 times daily.      EPINEPHrine (ANY BX  GENERIC EQUIV) 0.3 MG/0.3ML injection 2-pack INJECT CONTENTS OF 1 PEN AS NEEDED FOR ALLERGIC REACTION AS NEEDED. MAY REPEAT      latanoprost (XALATAN) 0.005 % ophthalmic solution INSTILL 1 DROP INTO EACH EYE IN THE EVENING      Melatonin 300 MCG TABS Take 1 tablet by mouth at bedtime.      metoprolol succinate ER (TOPROL XL) 25 MG 24 hr tablet Take 25 mg by mouth daily.      oxyBUTYnin ER (DITROPAN XL) 5 MG 24 hr tablet Take 5 mg by mouth daily.      pilocarpine (PILOCAR) 1 % ophthalmic solution Place 1 drop into the right eye 3 times daily.      senna (SENOKOT) 8.6 MG tablet Take 1 tablet by mouth every evening.      simvastatin (ZOCOR) 20 MG tablet Take 20 mg by mouth daily.      vitamin E 400 UNIT capsule Take 1 capsule by mouth daily.      XARELTO ANTICOAGULANT 20 MG TABS tablet Take 20 mg by mouth every evening.           STOP taking these medications       lisinopril-hydrochlorothiazide (ZESTORETIC) 20-12.5 MG tablet Comments:   Reason for Stopping:             Allergies   Allergies   Allergen Reactions    Seafood GI Disturbance and Hives    Shrimp      Other Reaction(s): Extrapyramidal Symptoms    Apixaban Rash    Influenza Virus Vaccine     Amoxicillin Rash

## 2025-05-21 ENCOUNTER — TELEPHONE (OUTPATIENT)
Dept: CARDIOLOGY | Facility: CLINIC | Age: 85
End: 2025-05-21

## 2025-05-21 ENCOUNTER — OFFICE VISIT (OUTPATIENT)
Dept: CARDIOLOGY | Facility: CLINIC | Age: 85
End: 2025-05-21
Payer: MEDICARE

## 2025-05-21 VITALS
WEIGHT: 167.8 LBS | DIASTOLIC BLOOD PRESSURE: 80 MMHG | RESPIRATION RATE: 16 BRPM | BODY MASS INDEX: 27.96 KG/M2 | HEART RATE: 60 BPM | SYSTOLIC BLOOD PRESSURE: 136 MMHG | HEIGHT: 65 IN

## 2025-05-21 DIAGNOSIS — I48.3 TYPICAL ATRIAL FLUTTER (H): Primary | ICD-10-CM

## 2025-05-21 PROCEDURE — G2211 COMPLEX E/M VISIT ADD ON: HCPCS | Performed by: INTERNAL MEDICINE

## 2025-05-21 PROCEDURE — 99205 OFFICE O/P NEW HI 60 MIN: CPT | Performed by: INTERNAL MEDICINE

## 2025-05-21 PROCEDURE — 3075F SYST BP GE 130 - 139MM HG: CPT | Performed by: INTERNAL MEDICINE

## 2025-05-21 PROCEDURE — 3079F DIAST BP 80-89 MM HG: CPT | Performed by: INTERNAL MEDICINE

## 2025-05-21 NOTE — H&P (VIEW-ONLY)
HEART CARE ENCOUNTER CONSULTATON NAOMI      [facility]  [phone]      Assessment/Recommendations   Assessment/Plan:    Lolis Pope is a very pleasant 85 year old female with PMH of atrial flutter, mitral regurgitation, CKD 3 sleep apnea, hypertension, former tobacco user who presents today to the EP clinic.    Severely symptomatic typical atrial flutter  - We reviewed the pathophysiology of atrial flutter and management considerations including stroke risk and anticoagulation, rate control, cardioversion, antiarrhythmic drug therapy, and catheter ablation. We discussed atrial flutter ablation procedures, anticipated success rates, the potential need for re-do ablation vs addition of anti-arrhythmic drugs, procedural risks (including groin bleeding, tamponade, stroke) and recovery expectations.  - she would like to proceed with an ablation, she also needs a knee surgery, she will think about timing and let us know  - we discussed the ongoing importance of lifestyle modification (maintaining a healthy weight, sleep apnea diagnosis and management, alcohol avoidance) as part of a long term strategy for atrial fibrillation management    2. Anticoagulation  - CHADSVASC score 4  - Continue Xarelto  - okay to hold prior to  knee surgery as needed    3. HTN  - controlled  - continue current meds      Time spent: 60 minutes spent on the date of the encounter doing chart review, history and exam, documentation and further activities as noted above.    The longitudinal plan of care for the condition(s) below were addressed during this visit. Due to the added complexity in care, I will continue support in the subsequent management of this condition(s) and with the ongoing continuity of care of this condition(s).        History of Present Illness/Subjective    HPI: Lolis Pope is a very pleasant 85 year old female with PMH of atrial flutter, mitral regurgitation, CKD 3 sleep apnea, hypertension,  "former tobacco user who presents today to the EP clinic.    Lolis was diagnosed with atrial flutter in 2021 and underwent a cardioversion at the time. More recently she had another episode in May 2015 when she spontaneously converted to sinus rhythm. Her symptoms of atrial flutter are fatigue/weakness and light headedness. She was supposed to have a knee surgery last week but had a dental procedure so her knee surgery got canceled.       Recent Echocardiogram/MRI Results (personally reviewed):    ECHO 7/2021 LVEF 50% with mild-moderate RV systolic dysfunction & bi-atrial enlargement.     Labs below reviewed personally     Physical Examination  Review of Systems   Vitals: /80 (BP Location: Left arm, Patient Position: Sitting, Cuff Size: Adult Large)   Pulse 60   Resp 16   Ht 1.651 m (5' 5\")   Wt 76.1 kg (167 lb 12.8 oz)   BMI 27.92 kg/m    BMI= Body mass index is 27.92 kg/m .  Wt Readings from Last 3 Encounters:   05/21/25 76.1 kg (167 lb 12.8 oz)   05/15/25 73.6 kg (162 lb 4.1 oz)   09/22/23 72.6 kg (160 lb)       General Appearance:   no distress, normal body habitus   ENT/Mouth: membranes moist, no oral lesions or bleeding gums.      EYES:  no scleral icterus, normal conjunctivae   Neck: no carotid bruits or thyromegaly   Chest/Lungs:   lungs are clear to auscultation, no rales or wheezing, no sternal scar, equal chest wall expansion    Cardiovascular:   Regular. Normal first and second heart sounds with no murmurs, rubs, or gallops; the carotid, radial and posterior tibial pulses are intact, no edema bilaterally    Abdomen:  no organomegaly, masses, bruits, or tenderness; bowel sounds are present   Extremities: no cyanosis or clubbing   Skin: no xanthelasma, warm.    Neurologic: normal  bilateral, no tremors     Psychiatric: alert and oriented x3, calm        Please refer above for cardiac ROS details.        Medical History  Surgical History Family History Social History   Past Medical History: "   Diagnosis Date    Osteoporosis      Past Surgical History:   Procedure Laterality Date    APPENDECTOMY      GYN SURGERY      hysterectomy    ORTHOPEDIC SURGERY      tib/fib fx ORIF, R)knee replacement    PHACOEMULSIFICATION WITH STANDARD INTRAOCULAR LENS IMPLANT  11/4/2010    PHACOEMULSIFICATION WITH STANDARD INTRAOCULAR LENS IMPLANT performed by ARLINE MONROE at [facility]    PHACOEMULSIFICATION WITH STANDARD INTRAOCULAR LENS IMPLANT  11/22/2010    PHACOEMULSIFICATION WITH STANDARD INTRAOCULAR LENS IMPLANT performed by ARLINE MONROE at [facility]     No family history on file.     Social History     Socioeconomic History    Marital status:      Spouse name: Not on file    Number of children: Not on file    Years of education: Not on file    Highest education level: Not on file   Occupational History    Not on file   Tobacco Use    Smoking status: Never    Smokeless tobacco: Not on file   Substance and Sexual Activity    Alcohol use: Yes     Comment: occasional    Drug use: No    Sexual activity: Not on file   Other Topics Concern    Parent/sibling w/ CABG, MI or angioplasty before 65F 55M? Not Asked   Social History Narrative    Not on file     Social Drivers of Health     Financial Resource Strain: Low Risk  (5/14/2025)    Financial Resource Strain     Within the past 12 months, have you or your family members you live with been unable to get utilities (heat, electricity) when it was really needed?: No   Food Insecurity: Low Risk  (5/14/2025)    Food Insecurity     Within the past 12 months, did you worry that your food would run out before you got money to buy more?: No     Within the past 12 months, did the food you bought just not last and you didn t have money to get more?: No   Transportation Needs: Low Risk  (5/14/2025)    Transportation Needs     Within the past 12 months, has lack of transportation kept you from medical appointments, getting your medicines, non-medical meetings or appointments,  work, or from getting things that you need?: No   Physical Activity: Inactive (4/8/2024)    Received from [facility]    Exercise Vital Sign     Days of Exercise per Week: 0 days     Minutes of Exercise per Session: 0 min   Stress: No Stress Concern Present (1/24/2023)    Received from [facility]    [employer] [place] [employer] - Occupational Stress Questionnaire     Feeling of Stress : Only a little   Social Connections: Moderately Isolated (1/24/2023)    Received from [facility]    Social Connection and Isolation Panel [NHANES]     Frequency of Communication with Friends and Family: More than three times a week     Frequency of Social Gatherings with Friends and Family: More than three times a week     Attends Christian Services: More than 4 times per year     Active Member of Clubs or Organizations: No     Attends Club or Organization Meetings: Never     Marital Status:    Interpersonal Safety: Not At Risk (1/24/2023)    Received from [facility]    Humiliation, Afraid, Rape, and Kick questionnaire     Fear of Current or Ex-Partner: No     Emotionally Abused: No     Physically Abused: No     Sexually Abused: No   Housing Stability: Low Risk  (5/14/2025)    Housing Stability     Do you have housing? : Yes     Are you worried about losing your housing?: No           Medications  Allergies   Current Outpatient Medications   Medication Sig Dispense Refill    acetaminophen (TYLENOL) 500 MG tablet Take 1 tablet by mouth 3 times daily.      calcium-magnesium (CALMAG) 500-250 MG TABS Take 1 tablet by mouth 2 times daily (with meals).      cholecalciferol (VITAMIN D) 400 UNIT TABS Take 400 Units by mouth daily.      citalopram (CELEXA) 10 MG tablet Take 10 mg by mouth daily.      dorzolamide-timolol (COSOPT) 22.3-6.8 MG/ML ophthalmic solution Place 1 drop into both eyes 2 times daily.      latanoprost (XALATAN) 0.005 % ophthalmic solution 1 drop at bedtime.      lisinopril (ZESTRIL) 20 MG tablet Take  "1.5 tablets (30 mg) by mouth daily. 45 tablet 0    Melatonin 300 MCG TABS Take 1 tablet by mouth at bedtime.      metoprolol succinate ER (TOPROL XL) 25 MG 24 hr tablet Take 25 mg by mouth daily.      pilocarpine (PILOCAR) 1 % ophthalmic solution Place 1 drop into the right eye 3 times daily.      senna (SENOKOT) 8.6 MG tablet Take 1 tablet by mouth every evening.      simvastatin (ZOCOR) 20 MG tablet Take 20 mg by mouth daily.      XARELTO ANTICOAGULANT 20 MG TABS tablet Take 20 mg by mouth every evening.      clindamycin (CLEOCIN) 150 MG capsule TAKE FOUR CAPSULES BY MOUTH ONE HOUR BEFORE APPOINTMENT (Patient not taking: Reported on 5/21/2025)      EPINEPHrine (ANY BX GENERIC EQUIV) 0.3 MG/0.3ML injection 2-pack INJECT CONTENTS OF 1 PEN AS NEEDED FOR ALLERGIC REACTION AS NEEDED. MAY REPEAT (Patient not taking: Reported on 5/21/2025)      oxyBUTYnin ER (DITROPAN XL) 5 MG 24 hr tablet Take 5 mg by mouth daily. (Patient not taking: Reported on 5/21/2025)      vitamin E 400 UNIT capsule Take 1 capsule by mouth daily. (Patient not taking: Reported on 5/21/2025)         Allergies   Allergen Reactions    Seafood GI Disturbance and Hives    Shrimp      Other Reaction(s): Extrapyramidal Symptoms    Apixaban Rash    Influenza Virus Vaccine     Amoxicillin Rash          Lab Results    Chemistry/lipid CBC Cardiac Enzymes/BNP/TSH/INR   No results for input(s): \"CHOL\", \"HDL\", \"LDL\", \"TRIG\", \"CHOLHDLRATIO\" in the last 77392 hours.  No results for input(s): \"LDL\" in the last 78274 hours.  Recent Labs   Lab Test 05/15/25  0633      POTASSIUM 4.6   CHLORIDE 104   CO2 25   GLC 97   BUN 22.0   CR 1.01*   GFRESTIMATED 54*   LIN 9.1     Recent Labs   Lab Test 05/15/25  0633 05/14/25  1044   CR 1.01* 1.07*     No results for input(s): \"A1C\" in the last 22151 hours.       Recent Labs   Lab Test 05/14/25  1044   WBC 8.1   HGB 14.4   HCT 43.1   MCV 94        Recent Labs   Lab Test 05/14/25  1044   HGB 14.4    No results for " "input(s): \"TROPONINI\" in the last 92794 hours.  No results for input(s): \"BNP\", \"NTBNPI\", \"NTBNP\" in the last 83732 hours.  No results for input(s): \"TSH\" in the last 67536 hours.  No results for input(s): \"INR\" in the last 16146 hours.     Adriana Nugent MD                                       "

## 2025-05-21 NOTE — PATIENT INSTRUCTIONS
[facility]  Cardiac Electrophysiology  [address]  [place] [address]   Office: [phone]  Fax: [phone]       Thank you for seeing us in clinic today - it is a pleasure to be a part of your care team.  Below is a summary of our plan from today's visit.      We will schedule you for an atrial flutter ablation  Continue current meds    Please do not hesitate to be in touch with our office at [phone] with any questions that may arise.      Thank you for trusting us with your care,    Adriana Nugent MD  Clinical Cardiac Electrophysiology  [facility]  [address]  [place] [address]   Office: [phone]  Fax: [phone]

## 2025-05-21 NOTE — TELEPHONE ENCOUNTER
Noted.  Will call patient in 2 days to determine her surgical plan and proceed from there.  Note postponed.

## 2025-05-21 NOTE — PROGRESS NOTES
HEART CARE ENCOUNTER CONSULTATON NAOMI      [facility]  [phone]      Assessment/Recommendations   Assessment/Plan:    Lolis Pope is a very pleasant 85 year old female with PMH of atrial flutter, mitral regurgitation, CKD 3 sleep apnea, hypertension, former tobacco user who presents today to the EP clinic.    Severely symptomatic typical atrial flutter  - We reviewed the pathophysiology of atrial flutter and management considerations including stroke risk and anticoagulation, rate control, cardioversion, antiarrhythmic drug therapy, and catheter ablation. We discussed atrial flutter ablation procedures, anticipated success rates, the potential need for re-do ablation vs addition of anti-arrhythmic drugs, procedural risks (including groin bleeding, tamponade, stroke) and recovery expectations.  - she would like to proceed with an ablation, she also needs a knee surgery, she will think about timing and let us know  - we discussed the ongoing importance of lifestyle modification (maintaining a healthy weight, sleep apnea diagnosis and management, alcohol avoidance) as part of a long term strategy for atrial fibrillation management    2. Anticoagulation  - CHADSVASC score 4  - Continue Xarelto  - okay to hold prior to  knee surgery as needed    3. HTN  - controlled  - continue current meds      Time spent: 60 minutes spent on the date of the encounter doing chart review, history and exam, documentation and further activities as noted above.    The longitudinal plan of care for the condition(s) below were addressed during this visit. Due to the added complexity in care, I will continue support in the subsequent management of this condition(s) and with the ongoing continuity of care of this condition(s).        History of Present Illness/Subjective    HPI: Lolis Pope is a very pleasant 85 year old female with PMH of atrial flutter, mitral regurgitation, CKD 3 sleep apnea, hypertension,  "former tobacco user who presents today to the EP clinic.    Lolis was diagnosed with atrial flutter in 2021 and underwent a cardioversion at the time. More recently she had another episode in May 2015 when she spontaneously converted to sinus rhythm. Her symptoms of atrial flutter are fatigue/weakness and light headedness. She was supposed to have a knee surgery last week but had a dental procedure so her knee surgery got canceled.       Recent Echocardiogram/MRI Results (personally reviewed):    ECHO 7/2021 LVEF 50% with mild-moderate RV systolic dysfunction & bi-atrial enlargement.     Labs below reviewed personally     Physical Examination  Review of Systems   Vitals: /80 (BP Location: Left arm, Patient Position: Sitting, Cuff Size: Adult Large)   Pulse 60   Resp 16   Ht 1.651 m (5' 5\")   Wt 76.1 kg (167 lb 12.8 oz)   BMI 27.92 kg/m    BMI= Body mass index is 27.92 kg/m .  Wt Readings from Last 3 Encounters:   05/21/25 76.1 kg (167 lb 12.8 oz)   05/15/25 73.6 kg (162 lb 4.1 oz)   09/22/23 72.6 kg (160 lb)       General Appearance:   no distress, normal body habitus   ENT/Mouth: membranes moist, no oral lesions or bleeding gums.      EYES:  no scleral icterus, normal conjunctivae   Neck: no carotid bruits or thyromegaly   Chest/Lungs:   lungs are clear to auscultation, no rales or wheezing, no sternal scar, equal chest wall expansion    Cardiovascular:   Regular. Normal first and second heart sounds with no murmurs, rubs, or gallops; the carotid, radial and posterior tibial pulses are intact, no edema bilaterally    Abdomen:  no organomegaly, masses, bruits, or tenderness; bowel sounds are present   Extremities: no cyanosis or clubbing   Skin: no xanthelasma, warm.    Neurologic: normal  bilateral, no tremors     Psychiatric: alert and oriented x3, calm        Please refer above for cardiac ROS details.        Medical History  Surgical History Family History Social History   Past Medical History: "   Diagnosis Date    Osteoporosis      Past Surgical History:   Procedure Laterality Date    APPENDECTOMY      GYN SURGERY      hysterectomy    ORTHOPEDIC SURGERY      tib/fib fx ORIF, R)knee replacement    PHACOEMULSIFICATION WITH STANDARD INTRAOCULAR LENS IMPLANT  11/4/2010    PHACOEMULSIFICATION WITH STANDARD INTRAOCULAR LENS IMPLANT performed by ARLINE MONROE at [facility]    PHACOEMULSIFICATION WITH STANDARD INTRAOCULAR LENS IMPLANT  11/22/2010    PHACOEMULSIFICATION WITH STANDARD INTRAOCULAR LENS IMPLANT performed by ARLINE MONROE at [facility]     No family history on file.     Social History     Socioeconomic History    Marital status:      Spouse name: Not on file    Number of children: Not on file    Years of education: Not on file    Highest education level: Not on file   Occupational History    Not on file   Tobacco Use    Smoking status: Never    Smokeless tobacco: Not on file   Substance and Sexual Activity    Alcohol use: Yes     Comment: occasional    Drug use: No    Sexual activity: Not on file   Other Topics Concern    Parent/sibling w/ CABG, MI or angioplasty before 65F 55M? Not Asked   Social History Narrative    Not on file     Social Drivers of Health     Financial Resource Strain: Low Risk  (5/14/2025)    Financial Resource Strain     Within the past 12 months, have you or your family members you live with been unable to get utilities (heat, electricity) when it was really needed?: No   Food Insecurity: Low Risk  (5/14/2025)    Food Insecurity     Within the past 12 months, did you worry that your food would run out before you got money to buy more?: No     Within the past 12 months, did the food you bought just not last and you didn t have money to get more?: No   Transportation Needs: Low Risk  (5/14/2025)    Transportation Needs     Within the past 12 months, has lack of transportation kept you from medical appointments, getting your medicines, non-medical meetings or appointments,  work, or from getting things that you need?: No   Physical Activity: Inactive (4/8/2024)    Received from [facility]    Exercise Vital Sign     Days of Exercise per Week: 0 days     Minutes of Exercise per Session: 0 min   Stress: No Stress Concern Present (1/24/2023)    Received from [facility]    [employer] - Occupational Stress Questionnaire     Feeling of Stress : Only a little   Social Connections: Moderately Isolated (1/24/2023)    Received from [facility]    Social Connection and Isolation Panel [NHANES]     Frequency of Communication with Friends and Family: More than three times a week     Frequency of Social Gatherings with Friends and Family: More than three times a week     Attends Restoration Services: More than 4 times per year     Active Member of Clubs or Organizations: No     Attends Club or Organization Meetings: Never     Marital Status:    Interpersonal Safety: Not At Risk (1/24/2023)    Received from [facility]    Humiliation, Afraid, Rape, and Kick questionnaire     Fear of Current or Ex-Partner: No     Emotionally Abused: No     Physically Abused: No     Sexually Abused: No   Housing Stability: Low Risk  (5/14/2025)    Housing Stability     Do you have housing? : Yes     Are you worried about losing your housing?: No           Medications  Allergies   Current Outpatient Medications   Medication Sig Dispense Refill    acetaminophen (TYLENOL) 500 MG tablet Take 1 tablet by mouth 3 times daily.      calcium-magnesium (CALMAG) 500-250 MG TABS Take 1 tablet by mouth 2 times daily (with meals).      cholecalciferol (VITAMIN D) 400 UNIT TABS Take 400 Units by mouth daily.      citalopram (CELEXA) 10 MG tablet Take 10 mg by mouth daily.      dorzolamide-timolol (COSOPT) 22.3-6.8 MG/ML ophthalmic solution Place 1 drop into both eyes 2 times daily.      latanoprost (XALATAN) 0.005 % ophthalmic solution 1 drop at bedtime.      lisinopril (ZESTRIL) 20 MG tablet Take  "1.5 tablets (30 mg) by mouth daily. 45 tablet 0    Melatonin 300 MCG TABS Take 1 tablet by mouth at bedtime.      metoprolol succinate ER (TOPROL XL) 25 MG 24 hr tablet Take 25 mg by mouth daily.      pilocarpine (PILOCAR) 1 % ophthalmic solution Place 1 drop into the right eye 3 times daily.      senna (SENOKOT) 8.6 MG tablet Take 1 tablet by mouth every evening.      simvastatin (ZOCOR) 20 MG tablet Take 20 mg by mouth daily.      XARELTO ANTICOAGULANT 20 MG TABS tablet Take 20 mg by mouth every evening.      clindamycin (CLEOCIN) 150 MG capsule TAKE FOUR CAPSULES BY MOUTH ONE HOUR BEFORE APPOINTMENT (Patient not taking: Reported on 5/21/2025)      EPINEPHrine (ANY BX GENERIC EQUIV) 0.3 MG/0.3ML injection 2-pack INJECT CONTENTS OF 1 PEN AS NEEDED FOR ALLERGIC REACTION AS NEEDED. MAY REPEAT (Patient not taking: Reported on 5/21/2025)      oxyBUTYnin ER (DITROPAN XL) 5 MG 24 hr tablet Take 5 mg by mouth daily. (Patient not taking: Reported on 5/21/2025)      vitamin E 400 UNIT capsule Take 1 capsule by mouth daily. (Patient not taking: Reported on 5/21/2025)         Allergies   Allergen Reactions    Seafood GI Disturbance and Hives    Shrimp      Other Reaction(s): Extrapyramidal Symptoms    Apixaban Rash    Influenza Virus Vaccine     Amoxicillin Rash          Lab Results    Chemistry/lipid CBC Cardiac Enzymes/BNP/TSH/INR   No results for input(s): \"CHOL\", \"HDL\", \"LDL\", \"TRIG\", \"CHOLHDLRATIO\" in the last 74785 hours.  No results for input(s): \"LDL\" in the last 34058 hours.  Recent Labs   Lab Test 05/15/25  0633      POTASSIUM 4.6   CHLORIDE 104   CO2 25   GLC 97   BUN 22.0   CR 1.01*   GFRESTIMATED 54*   LIN 9.1     Recent Labs   Lab Test 05/15/25  0633 05/14/25  1044   CR 1.01* 1.07*     No results for input(s): \"A1C\" in the last 99894 hours.       Recent Labs   Lab Test 05/14/25  1044   WBC 8.1   HGB 14.4   HCT 43.1   MCV 94        Recent Labs   Lab Test 05/14/25  1044   HGB 14.4    No results for " "input(s): \"TROPONINI\" in the last 76763 hours.  No results for input(s): \"BNP\", \"NTBNPI\", \"NTBNP\" in the last 35018 hours.  No results for input(s): \"TSH\" in the last 78869 hours.  No results for input(s): \"INR\" in the last 76637 hours.     Adriana Nugent MD                                       "

## 2025-05-21 NOTE — LETTER
5/21/2025    Bernie Jane PA-C  [facility] [place] [address]  [place] [address]    RE: Lolis Pope       Dear Colleague,     [facility] the [facility].    HEART CARE ENCOUNTER CONSULTATON NOTE      [facility]  [phone]      Assessment/Recommendations   Assessment/Plan:    Lolis Pope is a very pleasant 85 year old female with PMH of atrial flutter, mitral regurgitation, CKD 3 sleep apnea, hypertension, former tobacco user who presents today to the EP clinic.    Severely symptomatic typical atrial flutter  - We reviewed the pathophysiology of atrial flutter and management considerations including stroke risk and anticoagulation, rate control, cardioversion, antiarrhythmic drug therapy, and catheter ablation. We discussed atrial flutter ablation procedures, anticipated success rates, the potential need for re-do ablation vs addition of anti-arrhythmic drugs, procedural risks (including groin bleeding, tamponade, stroke) and recovery expectations.  - she would like to proceed with an ablation, she also needs a knee surgery, she will think about timing and let us know  - we discussed the ongoing importance of lifestyle modification (maintaining a healthy weight, sleep apnea diagnosis and management, alcohol avoidance) as part of a long term strategy for atrial fibrillation management    2. Anticoagulation  - CHADSVASC score 4  - Continue Xarelto  - okay to hold prior to  knee surgery as needed    3. HTN  - controlled  - continue current meds      Time spent: 60 minutes spent on the date of the encounter doing chart review, history and exam, documentation and further activities as noted above.    The longitudinal plan of care for the condition(s) below were addressed during this visit. Due to the added complexity in care, I will continue support in the subsequent management of this condition(s) and with the ongoing  "continuity of care of this condition(s).        History of Present Illness/Subjective    HPI: Lolis Pope is a very pleasant 85 year old female with PMH of atrial flutter, mitral regurgitation, CKD 3 sleep apnea, hypertension, former tobacco user who presents today to the EP clinic.    Lolis was diagnosed with atrial flutter in 2021 and underwent a cardioversion at the time. More recently she had another episode in May 2015 when she spontaneously converted to sinus rhythm. Her symptoms of atrial flutter are fatigue/weakness and light headedness. She was supposed to have a knee surgery last week but had a dental procedure so her knee surgery got canceled.       Recent Echocardiogram/MRI Results (personally reviewed):    ECHO 7/2021 LVEF 50% with mild-moderate RV systolic dysfunction & bi-atrial enlargement.     Labs below reviewed personally     Physical Examination  Review of Systems   Vitals: /80 (BP Location: Left arm, Patient Position: Sitting, Cuff Size: Adult Large)   Pulse 60   Resp 16   Ht 1.651 m (5' 5\")   Wt 76.1 kg (167 lb 12.8 oz)   BMI 27.92 kg/m    BMI= Body mass index is 27.92 kg/m .  Wt Readings from Last 3 Encounters:   05/21/25 76.1 kg (167 lb 12.8 oz)   05/15/25 73.6 kg (162 lb 4.1 oz)   09/22/23 72.6 kg (160 lb)       General Appearance:   no distress, normal body habitus   ENT/Mouth: membranes moist, no oral lesions or bleeding gums.      EYES:  no scleral icterus, normal conjunctivae   Neck: no carotid bruits or thyromegaly   Chest/Lungs:   lungs are clear to auscultation, no rales or wheezing, no sternal scar, equal chest wall expansion    Cardiovascular:   Regular. Normal first and second heart sounds with no murmurs, rubs, or gallops; the carotid, radial and posterior tibial pulses are intact, no edema bilaterally    Abdomen:  no organomegaly, masses, bruits, or tenderness; bowel sounds are present   Extremities: no cyanosis or clubbing   Skin: no xanthelasma, warm.  "   Neurologic: normal  bilateral, no tremors     Psychiatric: alert and oriented x3, calm        Please refer above for cardiac ROS details.        Medical History  Surgical History Family History Social History   Past Medical History:   Diagnosis Date     Osteoporosis      Past Surgical History:   Procedure Laterality Date     APPENDECTOMY       GYN SURGERY      hysterectomy     ORTHOPEDIC SURGERY      tib/fib fx ORIF, R)knee replacement     PHACOEMULSIFICATION WITH STANDARD INTRAOCULAR LENS IMPLANT  11/4/2010    PHACOEMULSIFICATION WITH STANDARD INTRAOCULAR LENS IMPLANT performed by ARLINE MONROE at [facility]     PHACOEMULSIFICATION WITH STANDARD INTRAOCULAR LENS IMPLANT  11/22/2010    PHACOEMULSIFICATION WITH STANDARD INTRAOCULAR LENS IMPLANT performed by ARLINE MONROE at [facility]     No family history on file.     Social History     Socioeconomic History     Marital status:      Spouse name: Not on file     Number of children: Not on file     Years of education: Not on file     Highest education level: Not on file   Occupational History     Not on file   Tobacco Use     Smoking status: Never     Smokeless tobacco: Not on file   Substance and Sexual Activity     Alcohol use: Yes     Comment: occasional     Drug use: No     Sexual activity: Not on file   Other Topics Concern     Parent/sibling w/ CABG, MI or angioplasty before 65F 55M? Not Asked   Social History Narrative     Not on file     Social Drivers of Health     Financial Resource Strain: Low Risk  (5/14/2025)    Financial Resource Strain      Within the past 12 months, have you or your family members you live with been unable to get utilities (heat, electricity) when it was really needed?: No   Food Insecurity: Low Risk  (5/14/2025)    Food Insecurity      Within the past 12 months, did you worry that your food would run out before you got money to buy more?: No      Within the past 12 months, did the food you bought just not last and you  didn t have money to get more?: No   Transportation Needs: Low Risk  (5/14/2025)    Transportation Needs      Within the past 12 months, has lack of transportation kept you from medical appointments, getting your medicines, non-medical meetings or appointments, work, or from getting things that you need?: No   Physical Activity: Inactive (4/8/2024)    Received from [facility]    Exercise Vital Sign      Days of Exercise per Week: 0 days      Minutes of Exercise per Session: 0 min   Stress: No Stress Concern Present (1/24/2023)    Received from [facility]    [employer] - Occupational Stress Questionnaire      Feeling of Stress : Only a little   Social Connections: Moderately Isolated (1/24/2023)    Received from [facility]    Social Connection and Isolation Panel [NHANES]      Frequency of Communication with Friends and Family: More than three times a week      Frequency of Social Gatherings with Friends and Family: More than three times a week      Attends Confucianism Services: More than 4 times per year      Active Member of Clubs or Organizations: No      Attends Club or Organization Meetings: Never      Marital Status:    Interpersonal Safety: Not At Risk (1/24/2023)    Received from [facility]    Humiliation, Afraid, Rape, and Kick questionnaire      Fear of Current or Ex-Partner: No      Emotionally Abused: No      Physically Abused: No      Sexually Abused: No   Housing Stability: Low Risk  (5/14/2025)    Housing Stability      Do you have housing? : Yes      Are you worried about losing your housing?: No           Medications  Allergies   Current Outpatient Medications   Medication Sig Dispense Refill     acetaminophen (TYLENOL) 500 MG tablet Take 1 tablet by mouth 3 times daily.       calcium-magnesium (CALMAG) 500-250 MG TABS Take 1 tablet by mouth 2 times daily (with meals).       cholecalciferol (VITAMIN D) 400 UNIT TABS Take 400 Units by mouth daily.        "citalopram (CELEXA) 10 MG tablet Take 10 mg by mouth daily.       dorzolamide-timolol (COSOPT) 22.3-6.8 MG/ML ophthalmic solution Place 1 drop into both eyes 2 times daily.       latanoprost (XALATAN) 0.005 % ophthalmic solution 1 drop at bedtime.       lisinopril (ZESTRIL) 20 MG tablet Take 1.5 tablets (30 mg) by mouth daily. 45 tablet 0     Melatonin 300 MCG TABS Take 1 tablet by mouth at bedtime.       metoprolol succinate ER (TOPROL XL) 25 MG 24 hr tablet Take 25 mg by mouth daily.       pilocarpine (PILOCAR) 1 % ophthalmic solution Place 1 drop into the right eye 3 times daily.       senna (SENOKOT) 8.6 MG tablet Take 1 tablet by mouth every evening.       simvastatin (ZOCOR) 20 MG tablet Take 20 mg by mouth daily.       XARELTO ANTICOAGULANT 20 MG TABS tablet Take 20 mg by mouth every evening.       clindamycin (CLEOCIN) 150 MG capsule TAKE FOUR CAPSULES BY MOUTH ONE HOUR BEFORE APPOINTMENT (Patient not taking: Reported on 5/21/2025)       EPINEPHrine (ANY BX GENERIC EQUIV) 0.3 MG/0.3ML injection 2-pack INJECT CONTENTS OF 1 PEN AS NEEDED FOR ALLERGIC REACTION AS NEEDED. MAY REPEAT (Patient not taking: Reported on 5/21/2025)       oxyBUTYnin ER (DITROPAN XL) 5 MG 24 hr tablet Take 5 mg by mouth daily. (Patient not taking: Reported on 5/21/2025)       vitamin E 400 UNIT capsule Take 1 capsule by mouth daily. (Patient not taking: Reported on 5/21/2025)         Allergies   Allergen Reactions     Seafood GI Disturbance and Hives     Shrimp      Other Reaction(s): Extrapyramidal Symptoms     Apixaban Rash     Influenza Virus Vaccine      Amoxicillin Rash          Lab Results    Chemistry/lipid CBC Cardiac Enzymes/BNP/TSH/INR   No results for input(s): \"CHOL\", \"HDL\", \"LDL\", \"TRIG\", \"CHOLHDLRATIO\" in the last 57554 hours.  No results for input(s): \"LDL\" in the last 55568 hours.  Recent Labs   Lab Test 05/15/25  0633      POTASSIUM 4.6   CHLORIDE 104   CO2 25   GLC 97   BUN 22.0   CR 1.01*   GFRESTIMATED 54* " "  LIN 9.1     Recent Labs   Lab Test 05/15/25  0633 05/14/25  1044   CR 1.01* 1.07*     No results for input(s): \"A1C\" in the last 46965 hours.       Recent Labs   Lab Test 05/14/25  1044   WBC 8.1   HGB 14.4   HCT 43.1   MCV 94        Recent Labs   Lab Test 05/14/25  1044   HGB 14.4    No results for input(s): \"TROPONINI\" in the last 94138 hours.  No results for input(s): \"BNP\", \"NTBNPI\", \"NTBNP\" in the last 05685 hours.  No results for input(s): \"TSH\" in the last 35157 hours.  No results for input(s): \"INR\" in the last 27467 hours.     Adriana Nugent MD                                         Thank you for allowing me to participate in the care of your patient.      Sincerely,     Adriana Nugent MD     [facility]  cc:   Adriana Nugent MD  [address] [place] [id number]      "

## 2025-05-21 NOTE — TELEPHONE ENCOUNTER
----- Message from Adriana Nugent sent at 5/21/2025  3:52 PM CDT -----  Dear team,       Please schedule Lolis for an atrial flutter ablation    She needs a knee surgery also. Please schedule her for the ablation after 4 weeks of uninterrupted anticoagulation post knee surgery in case she opts for her knee surgery first.    General Anesthesia  CARTO mapping system with RF  CONTINUE metoprolol 3 days prior to ablation  Continue anticoagulation  CBC, BMP  on day of procedure    Thanks  VERNON

## 2025-06-09 ENCOUNTER — TELEPHONE (OUTPATIENT)
Dept: CARDIOLOGY | Facility: CLINIC | Age: 85
End: 2025-06-09
Payer: COMMERCIAL

## 2025-06-09 NOTE — TELEPHONE ENCOUNTER
Pre-Procedure Education    Procedure: AFL Abaltion with Dr Nugent on 6/16 with arrival time 6:30 am    Orders: Orderset for procedure verified signed/held    COVID: COVID policy- if pt develops COVID like symptoms prior to procedure, he/she would need to complete an at home with a rapid antigen COVID test 1-2 days prior to your procedure date. If COVID + pt is aware the procedure will need to be rescheduled, and to contact CV scheduling as soon as possible    Pre-Op H&P: Completed- Available in Epic    Education:   Contact: Attempted to contact pt via phone, VM was left with request that pt return call to review above/below information  Pre-Procedure Instruction: NPO after midnight pre procedure, Defined NPO with pt, Remove all jewelry and leave all valuables at home, Shower prior to arrival, Sedation plan/orders, Transportation requirements and arrangements post procedure, Post-procedure follow up process, Post-procedure restrictions/expectations, and Pre-procedure letter sent- letter tab  Risks:  Cardiac Ablation  <1% Hypotension, Hemorrhage, Thrombophlebitis, Systemic or pulmonic emboli, Cardiac perforation (tamponade), Infection, Pneumothorax, Arrhythmias, Proarrhythmic effects of drugs, Radiation exposure, Catheter entrapment  <1 % Vascular injury including perforation of vein, artery or heart  1-2% Complete heart block (for AVNRT or septal accessory pathway)  <0.5% CVA or MI, 1% CVA if Left sided ablation  <0.1% death  If external defibrillation or CV is needed, 25% risk for superficial burn  1-2% Tamponade and Aortic puncture with left sided transeptal approach  Risks associated with general anesthesia will be addressed by the Anesthesiology Department      Medication:   Instructions regarding anticoagulants: Xarelto- To continue anticoagulation uninterrupted through their procedure  Instructions regarding antiarrhythmic medication: Beta Blocker; continue medication prior to procedure as  prescribed  Instructions given to pt regarding diuretics medication: None  Instructions given to pt regarding Diabetic medications:   DM- None  GLP-1- None  SGLT2- None   Instructions given to pt regarding ED medication: None  Instructions for medication, other than anticoagulants and antiarrhythmics listed above, given to pt: Take all medication AM of procedure with small sips of water     Important patient information for staff: None    6/9/2025 10:51 AM  Elizabeth Andrew RN

## 2025-06-16 ENCOUNTER — ANESTHESIA EVENT (OUTPATIENT)
Dept: CARDIOLOGY | Facility: HOSPITAL | Age: 85
End: 2025-06-16
Payer: MEDICARE

## 2025-06-16 ENCOUNTER — ANESTHESIA (OUTPATIENT)
Dept: CARDIOLOGY | Facility: HOSPITAL | Age: 85
End: 2025-06-16
Payer: MEDICARE

## 2025-06-16 ENCOUNTER — HOSPITAL ENCOUNTER (OUTPATIENT)
Facility: HOSPITAL | Age: 85
Discharge: HOME | End: 2025-06-16
Attending: INTERNAL MEDICINE | Admitting: INTERNAL MEDICINE
Payer: MEDICARE

## 2025-06-16 VITALS
OXYGEN SATURATION: 96 % | WEIGHT: 164 LBS | SYSTOLIC BLOOD PRESSURE: 141 MMHG | HEIGHT: 65 IN | RESPIRATION RATE: 16 BRPM | BODY MASS INDEX: 27.32 KG/M2 | DIASTOLIC BLOOD PRESSURE: 70 MMHG | TEMPERATURE: 97.8 F | HEART RATE: 72 BPM

## 2025-06-16 DIAGNOSIS — I48.3 TYPICAL ATRIAL FLUTTER (H): ICD-10-CM

## 2025-06-16 LAB
ANION GAP SERPL CALCULATED.3IONS-SCNC: 9 MMOL/L (ref 7–15)
ATRIAL RATE - MUSE: 52 BPM
BUN SERPL-MCNC: 15.6 MG/DL (ref 8–23)
CALCIUM SERPL-MCNC: 9.5 MG/DL (ref 8.8–10.4)
CHLORIDE SERPL-SCNC: 105 MMOL/L (ref 98–107)
CREAT SERPL-MCNC: 0.9 MG/DL (ref 0.51–0.95)
DIASTOLIC BLOOD PRESSURE - MUSE: NORMAL MMHG
EGFRCR SERPLBLD CKD-EPI 2021: 62 ML/MIN/1.73M2
ERYTHROCYTE [DISTWIDTH] IN BLOOD BY AUTOMATED COUNT: 13.2 % (ref 10–15)
GLUCOSE SERPL-MCNC: 106 MG/DL (ref 70–99)
HCO3 SERPL-SCNC: 27 MMOL/L (ref 22–29)
HCT VFR BLD AUTO: 39.4 % (ref 35–47)
HGB BLD-MCNC: 13.1 G/DL (ref 11.7–15.7)
INTERPRETATION ECG - MUSE: NORMAL
MCH RBC QN AUTO: 31.3 PG (ref 26.5–33)
MCHC RBC AUTO-ENTMCNC: 33.2 G/DL (ref 31.5–36.5)
MCV RBC AUTO: 94 FL (ref 78–100)
P AXIS - MUSE: 85 DEGREES
PLATELET # BLD AUTO: 202 10E3/UL (ref 150–450)
POTASSIUM SERPL-SCNC: 4.3 MMOL/L (ref 3.4–5.3)
PR INTERVAL - MUSE: 280 MS
QRS DURATION - MUSE: 92 MS
QT - MUSE: 452 MS
QTC - MUSE: 420 MS
R AXIS - MUSE: 36 DEGREES
RBC # BLD AUTO: 4.19 10E6/UL (ref 3.8–5.2)
SODIUM SERPL-SCNC: 141 MMOL/L (ref 135–145)
SYSTOLIC BLOOD PRESSURE - MUSE: NORMAL MMHG
T AXIS - MUSE: -17 DEGREES
VENTRICULAR RATE- MUSE: 52 BPM
WBC # BLD AUTO: 5.7 10E3/UL (ref 4–11)

## 2025-06-16 PROCEDURE — C1887 CATHETER, GUIDING: HCPCS | Performed by: INTERNAL MEDICINE

## 2025-06-16 PROCEDURE — 272N000001 HC OR GENERAL SUPPLY STERILE: Performed by: INTERNAL MEDICINE

## 2025-06-16 PROCEDURE — 250N000009 HC RX 250: Performed by: NURSE ANESTHETIST, CERTIFIED REGISTERED

## 2025-06-16 PROCEDURE — 93653 COMPRE EP EVAL TX SVT: CPT | Performed by: INTERNAL MEDICINE

## 2025-06-16 PROCEDURE — C1894 INTRO/SHEATH, NON-LASER: HCPCS | Performed by: INTERNAL MEDICINE

## 2025-06-16 PROCEDURE — 85014 HEMATOCRIT: CPT | Performed by: INTERNAL MEDICINE

## 2025-06-16 PROCEDURE — 93662 INTRACARDIAC ECG (ICE): CPT | Mod: 26 | Performed by: INTERNAL MEDICINE

## 2025-06-16 PROCEDURE — 250N000011 HC RX IP 250 OP 636: Performed by: INTERNAL MEDICINE

## 2025-06-16 PROCEDURE — 258N000003 HC RX IP 258 OP 636: Performed by: INTERNAL MEDICINE

## 2025-06-16 PROCEDURE — 80048 BASIC METABOLIC PNL TOTAL CA: CPT | Performed by: INTERNAL MEDICINE

## 2025-06-16 PROCEDURE — C1759 CATH, INTRA ECHOCARDIOGRAPHY: HCPCS | Performed by: INTERNAL MEDICINE

## 2025-06-16 PROCEDURE — 250N000013 HC RX MED GY IP 250 OP 250 PS 637: Performed by: INTERNAL MEDICINE

## 2025-06-16 PROCEDURE — 36415 COLL VENOUS BLD VENIPUNCTURE: CPT | Performed by: INTERNAL MEDICINE

## 2025-06-16 PROCEDURE — 93010 ELECTROCARDIOGRAM REPORT: CPT | Mod: HOP | Performed by: INTERNAL MEDICINE

## 2025-06-16 PROCEDURE — 999N000054 HC STATISTIC EKG NON-CHARGEABLE

## 2025-06-16 PROCEDURE — 370N000017 HC ANESTHESIA TECHNICAL FEE, PER MIN: Performed by: INTERNAL MEDICINE

## 2025-06-16 PROCEDURE — C1766 INTRO/SHEATH,STRBLE,NON-PEEL: HCPCS | Performed by: INTERNAL MEDICINE

## 2025-06-16 PROCEDURE — 93005 ELECTROCARDIOGRAM TRACING: CPT

## 2025-06-16 PROCEDURE — C1732 CATH, EP, DIAG/ABL, 3D/VECT: HCPCS | Performed by: INTERNAL MEDICINE

## 2025-06-16 PROCEDURE — 250N000011 HC RX IP 250 OP 636: Performed by: NURSE ANESTHETIST, CERTIFIED REGISTERED

## 2025-06-16 RX ORDER — ONDANSETRON 2 MG/ML
4 INJECTION INTRAMUSCULAR; INTRAVENOUS EVERY 6 HOURS PRN
Status: DISCONTINUED | OUTPATIENT
Start: 2025-06-16 | End: 2025-06-16 | Stop reason: HOSPADM

## 2025-06-16 RX ORDER — DEXAMETHASONE SODIUM PHOSPHATE 4 MG/ML
INJECTION, SOLUTION INTRA-ARTICULAR; INTRALESIONAL; INTRAMUSCULAR; INTRAVENOUS; SOFT TISSUE PRN
Status: DISCONTINUED | OUTPATIENT
Start: 2025-06-16 | End: 2025-06-16

## 2025-06-16 RX ORDER — SODIUM CHLORIDE 9 MG/ML
100 INJECTION, SOLUTION INTRAVENOUS CONTINUOUS
Status: DISCONTINUED | OUTPATIENT
Start: 2025-06-16 | End: 2025-06-16 | Stop reason: HOSPADM

## 2025-06-16 RX ORDER — ONDANSETRON 2 MG/ML
INJECTION INTRAMUSCULAR; INTRAVENOUS PRN
Status: DISCONTINUED | OUTPATIENT
Start: 2025-06-16 | End: 2025-06-16

## 2025-06-16 RX ORDER — PROPOFOL 10 MG/ML
INJECTION, EMULSION INTRAVENOUS PRN
Status: DISCONTINUED | OUTPATIENT
Start: 2025-06-16 | End: 2025-06-16

## 2025-06-16 RX ORDER — DEXMEDETOMIDINE HYDROCHLORIDE 4 UG/ML
.1-1.5 INJECTION, SOLUTION INTRAVENOUS CONTINUOUS
Status: DISCONTINUED | OUTPATIENT
Start: 2025-06-16 | End: 2025-06-16 | Stop reason: HOSPADM

## 2025-06-16 RX ORDER — ONDANSETRON 4 MG/1
4 TABLET, ORALLY DISINTEGRATING ORAL EVERY 6 HOURS PRN
Status: DISCONTINUED | OUTPATIENT
Start: 2025-06-16 | End: 2025-06-16 | Stop reason: HOSPADM

## 2025-06-16 RX ORDER — PROPOFOL 10 MG/ML
INJECTION, EMULSION INTRAVENOUS CONTINUOUS PRN
Status: DISCONTINUED | OUTPATIENT
Start: 2025-06-16 | End: 2025-06-16

## 2025-06-16 RX ORDER — ACETAMINOPHEN 325 MG/1
650 TABLET ORAL EVERY 4 HOURS PRN
Status: DISCONTINUED | OUTPATIENT
Start: 2025-06-16 | End: 2025-06-16 | Stop reason: HOSPADM

## 2025-06-16 RX ORDER — LIDOCAINE HYDROCHLORIDE AND EPINEPHRINE 10; 10 MG/ML; UG/ML
INJECTION, SOLUTION INFILTRATION; PERINEURAL
Status: DISCONTINUED | OUTPATIENT
Start: 2025-06-16 | End: 2025-06-16 | Stop reason: HOSPADM

## 2025-06-16 RX ORDER — FENTANYL CITRATE 50 UG/ML
25 INJECTION, SOLUTION INTRAMUSCULAR; INTRAVENOUS
Status: DISCONTINUED | OUTPATIENT
Start: 2025-06-16 | End: 2025-06-16 | Stop reason: HOSPADM

## 2025-06-16 RX ORDER — LIDOCAINE 40 MG/G
CREAM TOPICAL
Status: DISCONTINUED | OUTPATIENT
Start: 2025-06-16 | End: 2025-06-16 | Stop reason: HOSPADM

## 2025-06-16 RX ORDER — LIDOCAINE HYDROCHLORIDE 10 MG/ML
INJECTION, SOLUTION INFILTRATION; PERINEURAL PRN
Status: DISCONTINUED | OUTPATIENT
Start: 2025-06-16 | End: 2025-06-16

## 2025-06-16 RX ADMIN — ONDANSETRON 4 MG: 2 INJECTION INTRAMUSCULAR; INTRAVENOUS at 09:18

## 2025-06-16 RX ADMIN — PROPOFOL 50 MG: 10 INJECTION, EMULSION INTRAVENOUS at 09:02

## 2025-06-16 RX ADMIN — DEXAMETHASONE SODIUM PHOSPHATE 4 MG: 4 INJECTION, SOLUTION INTRA-ARTICULAR; INTRALESIONAL; INTRAMUSCULAR; INTRAVENOUS; SOFT TISSUE at 09:18

## 2025-06-16 RX ADMIN — LIDOCAINE HYDROCHLORIDE 30 MG: 10 INJECTION, SOLUTION INFILTRATION; PERINEURAL at 09:02

## 2025-06-16 RX ADMIN — PROPOFOL 50 MG: 10 INJECTION, EMULSION INTRAVENOUS at 09:27

## 2025-06-16 RX ADMIN — ACETAMINOPHEN 650 MG: 325 TABLET ORAL at 10:26

## 2025-06-16 RX ADMIN — SODIUM CHLORIDE: 9 INJECTION, SOLUTION INTRAVENOUS at 09:01

## 2025-06-16 RX ADMIN — SODIUM CHLORIDE: 9 INJECTION, SOLUTION INTRAVENOUS at 08:57

## 2025-06-16 RX ADMIN — PROPOFOL 100 MCG/KG/MIN: 10 INJECTION, EMULSION INTRAVENOUS at 09:03

## 2025-06-16 ASSESSMENT — ACTIVITIES OF DAILY LIVING (ADL)
ADLS_ACUITY_SCORE: 59

## 2025-06-16 ASSESSMENT — ENCOUNTER SYMPTOMS: DYSRHYTHMIAS: 1

## 2025-06-16 NOTE — INTERVAL H&P NOTE
"I have reviewed the surgical (or preoperative) H&P that is linked to this encounter, and examined the patient. There are no significant changes    Clinical Conditions Present on Arrival:  Clinically Significant Risk Factors Present on Admission                 # Drug Induced Coagulation Defect: home medication list includes an anticoagulant medication       # Overweight: Estimated body mass index is 27.29 kg/m  as calculated from the following:    Height as of this encounter: 1.651 m (5' 5\").    Weight as of this encounter: 74.4 kg (164 lb).       "

## 2025-06-16 NOTE — PROGRESS NOTES
Patient is kept comfortable during post-procedure stay. VSS. Denies pain. Right femoral access site remains dry & free from signs of bleeding. Tolerated food and fluids. Ambulated without issues. Appointments made & included in AVS. Dr. Nugent was able to speak with patient post procedure. Post-op instructions reviewed and packet given to patient & sons Able to ask questions. Verbalized no concerns. Belongings returned. Discharged in stable condition.

## 2025-06-16 NOTE — Clinical Note
Grounding pads are removed from the patient. The skin is clean, dry, intact, and free from redness.  nurses notes/vital signs

## 2025-06-16 NOTE — ANESTHESIA CARE TRANSFER NOTE
Patient: Lolis Pope    Procedure: Procedure(s):  Ablation Atrial Flutter       Diagnosis: AF  Diagnosis Additional Information: No value filed.    Anesthesia Type:   MAC     Note:    Oropharynx: oropharynx clear of all foreign objects and spontaneously breathing  Level of Consciousness: awake  Oxygen Supplementation: room air    Independent Airway: airway patency satisfactory and stable  Dentition: dentition unchanged  Vital Signs Stable: post-procedure vital signs reviewed and stable  Report to RN Given: handoff report given  Patient transferred to: PACU    Handoff Report: Identifed the Patient, Identified the Reponsible Provider, Reviewed the pertinent medical history, Discussed the surgical course, Reviewed Intra-OP anesthesia mangement and issues during anesthesia, Set expectations for post-procedure period and Allowed opportunity for questions and acknowledgement of understanding      Vitals:  Vitals Value Taken Time   /94 06/16/25 09:46   Temp 36.6  C (97.8  F) 06/16/25 09:46   Pulse 73 06/16/25 09:46   Resp 16 06/16/25 09:46   SpO2 93 % 06/16/25 09:46   Vitals shown include unfiled device data.    Electronically Signed By: OPAL Chang CRNA  June 16, 2025  9:48 AM

## 2025-06-16 NOTE — DISCHARGE INSTRUCTIONS
[facility] Emergency Department  Cardiac Electrophysiology  [address]  [place] [address]   Office: [phone]  Fax: [phone]     Cardiac Electrophysiology - Post Ablation Discharge Instructions      PROCEDURE   Atrial flutter ablation       MEDICATION INSTRUCTIONS   Continue taking all home meds         DISCHARGE INSTRUCTIONS   General instructions  Have an adult stay with you until tomorrow.  You may resume your normal diet.    You may shower tomorrow.  Do NOT take a bath, or use a hot tub or pool for at least 1 week. Do not scrub the site. Do not use lotion or powder near the puncture site.    Groin care instructions  For the first 24 hrs - check the puncture site every 1-2 hours while awake.  You may keep a bandaid over the puncture sites for 1 or 2 days post-procedure and thereafter may keep these sites uncovered.  Change the bandaid daily.  If there is minor oozing, apply another bandaid and remove it after 12 hours.  For 2 days, when you cough, sneeze, laugh or move your bowels, hold your hand over the puncture site and press firmly.  Mild bruising at the access sites is normal.  If you notice increased swelling, external bleeding, or have other concerns regarding your access sites please consider emergency department evaluation and call your electrophysiology team's office    Activity recommendations  Do not drive for 3 days.  Avoid stooping or squatting more than 90 degrees at the hips for 7 days  Avoid repetitive motions such as loading , vacuuming, raking or shoveling  Avoid heavy lifting (greater than 25lbs) for 1 week    Post ablation instructions  You may have some irregular heartbeats following your ablation.  These episodes should occur less frequently over time.  Pleuritic chest discomfort (chest pain worse with taking deep breaths, worse with laying flat on your back) can occur after ablation, usually coming about within the first 24-48hrs post ablation.  If  this occurs and is severe enough to be troublesome to you, please call us and consider starting a course of ibuprofen 400mg three times daily for 5 to 7 days    Things to watch for  As with any type of procedure, please be more attentive to unusual symptoms post ablation (eg. fever, neurologic changes, pain with swallowing, loss of consciousness, etc) - we recommend ER evaluation for any such symptoms in the first few weeks post procedure.    Consider ER evaluation for the following:  Severe chest pain not relieved by Tylenol or Ibuprofen  You have chills or a fever greater than 101 F (38 C)  Neurologic changes (eg. leg, arm or face weakness or numbness, difficulties with speech or word finding, problems walking or with your balance, vision changes)  Severe difficulty swallowing and/or you are coughing up blood  Shortness of breath  Increased groin pain or a large or growing hard lump around the site  Groin is red, swollen, hot or tender  Blood or fluid is draining from the groin site  Any numbness, coolness or changes in color in your extremities  Groin pain not relieved by Tylenol or Advil      Our office will have a follow-up visit scheduled for you in approximately 6 weeks.  Please do not hesitate to call us before that time should issues arise.        [facility]    [phone]    If you are calling after hours, please listen to the entire voicemail,   a live  will answer at the end of the message.    [phone] to reach the EP nurses working with Dr Nugent

## 2025-06-16 NOTE — Clinical Note
[employer] system 12 lead EKG, hemodynamics 5 lead, pulse oximetery, NIBP, [employer] hands off defibrillator/external pacer, with 3 monitoring leads to patient. Baseline assessment done.

## 2025-06-16 NOTE — ANESTHESIA POSTPROCEDURE EVALUATION
Patient: Lolis Pope    Procedure: Procedure(s):  Ablation Atrial Flutter       Anesthesia Type:  MAC    Note:  Disposition: Outpatient   Postop Pain Control: Uneventful            Sign Out: Well controlled pain   PONV: No   Neuro/Psych: Uneventful            Sign Out: Acceptable/Baseline neuro status   Airway/Respiratory: Uneventful            Sign Out: Acceptable/Baseline resp. status   CV/Hemodynamics: Uneventful            Sign Out: Acceptable CV status; No obvious hypovolemia; No obvious fluid overload   Other NRE: NONE   DID A NON-ROUTINE EVENT OCCUR? No         Last vitals:  Vitals Value Taken Time   /74 06/16/25 10:15   Temp 36.6  C (97.8  F) 06/16/25 09:46   Pulse 58 06/16/25 10:28   Resp 18 06/16/25 10:28   SpO2 94 % 06/16/25 10:28   Vitals shown include unfiled device data.    Electronically Signed By: Tiara Silver MD  June 16, 2025  10:30 AM

## 2025-06-16 NOTE — ANESTHESIA PREPROCEDURE EVALUATION
Anesthesia Pre-Procedure Evaluation    Patient: Lolis Pope   MRN: [id number] : 1940          Procedure : Procedure(s):  Ablation Atrial Flutter         Past Medical History:   Diagnosis Date    Osteoporosis       Past Surgical History:   Procedure Laterality Date    APPENDECTOMY      GYN SURGERY      hysterectomy    ORTHOPEDIC SURGERY      tib/fib fx ORIF, R)knee replacement    PHACOEMULSIFICATION WITH STANDARD INTRAOCULAR LENS IMPLANT  2010    PHACOEMULSIFICATION WITH STANDARD INTRAOCULAR LENS IMPLANT performed by ARLINE MONROE at [facility]    PHACOEMULSIFICATION WITH STANDARD INTRAOCULAR LENS IMPLANT  2010    PHACOEMULSIFICATION WITH STANDARD INTRAOCULAR LENS IMPLANT performed by ARLINE MONROE at [facility]      Allergies   Allergen Reactions    Seafood GI Disturbance and Hives    Shrimp      Other Reaction(s): Extrapyramidal Symptoms    Apixaban Rash    Influenza Virus Vaccine     Amoxicillin Rash      Social History     Tobacco Use    Smoking status: Never    Smokeless tobacco: Not on file   Substance Use Topics    Alcohol use: Yes     Comment: occasional      Wt Readings from Last 1 Encounters:   25 76.1 kg (167 lb 12.8 oz)        Anesthesia Evaluation   Pt has had prior anesthetic. Type: General.    History of anesthetic complications  - PONV.      ROS/MED HX  ENT/Pulmonary:     (+) sleep apnea, mild, doesn't use CPAP,                                      Neurologic:    (-) no TIA   Cardiovascular:     (+) Dyslipidemia hypertension- -   -  - -   Taking blood thinners                     dysrhythmias,              METS/Exercise Tolerance:     Hematologic:       Musculoskeletal:       GI/Hepatic:    (-) GERD   Renal/Genitourinary:     (+) renal disease, type: CRI,            Endo:       Psychiatric/Substance Use:     (+) psychiatric history depression       Infectious Disease:       Malignancy:       Other:              Physical Exam  Airway  Mallampati: II  TM distance: >3  "FB  Neck ROM: full    Cardiovascular   Rhythm: regular     Dental   (+) Modest Abnormalities - crowns, retainers, 1 or 2 missing teeth      Pulmonary Breath sounds clear to auscultation        Neurological   She appears awake and alert.    Other Findings       OUTSIDE LABS:  CBC:   Lab Results   Component Value Date    WBC 8.1 05/14/2025    HGB 14.4 05/14/2025    HCT 43.1 05/14/2025     05/14/2025     BMP:   Lab Results   Component Value Date     05/15/2025     05/14/2025    POTASSIUM 4.6 05/15/2025    POTASSIUM 4.5 05/14/2025    CHLORIDE 104 05/15/2025    CHLORIDE 100 05/14/2025    CO2 25 05/15/2025    CO2 23 05/14/2025    BUN 22.0 05/15/2025    BUN 26.2 (H) 05/14/2025    CR 1.01 (H) 05/15/2025    CR 1.07 (H) 05/14/2025    GLC 97 05/15/2025     (H) 05/14/2025     COAGS: No results found for: \"PTT\", \"INR\", \"FIBR\"  POC: No results found for: \"BGM\", \"HCG\", \"HCGS\"  HEPATIC:   Lab Results   Component Value Date    ALBUMIN 4.1 05/14/2025    PROTTOTAL 6.5 05/14/2025    ALT 16 05/14/2025    AST 21 05/14/2025    ALKPHOS 66 05/14/2025    BILITOTAL 0.6 05/14/2025     OTHER:   Lab Results   Component Value Date    LACT 1.5 05/14/2025    LIN 9.1 05/15/2025    MAG 2.5 (H) 05/15/2025    LIPASE 36 05/14/2025       Anesthesia Plan    ASA Status:  3      NPO Status: NPO Appropriate   Anesthesia Type: MAC.  Airway: natural airway.  Induction: intravenous.  Maintenance: TIVA.   Techniques and Equipment:       - Monitoring Plan: standard ASA monitoring     Consents    Anesthesia Plan(s) and associated risks, benefits, and realistic alternatives discussed. Questions answered and patient/representative(s) expressed understanding.     - Discussed:     - Discussed with:  Patient        - Pt is DNR/DNI Status: no DNR     Blood Consent:      - Discussed with: patient.     - Consented: consented to blood products     Postoperative Care    Pain management: non-narcotic analgesics.     Comments:                   Tiara " "PAUL Silver MD    I have reviewed the pertinent notes and labs in the chart from the past 30 days and (re)examined the patient.  Any updates or changes from those notes are reflected in this note.    Clinically Significant Risk Factors Present on Admission                # Drug Induced Coagulation Defect: home medication list includes an anticoagulant medication    # Hypertension: Noted on problem list           # Overweight: Estimated body mass index is 27.92 kg/m  as calculated from the following:    Height as of 5/21/25: 1.651 m (5' 5\").    Weight as of 5/21/25: 76.1 kg (167 lb 12.8 oz).                    "

## 2025-06-17 ENCOUNTER — TELEPHONE (OUTPATIENT)
Dept: CARDIOLOGY | Facility: CLINIC | Age: 85
End: 2025-06-17
Payer: COMMERCIAL

## 2025-06-17 NOTE — TELEPHONE ENCOUNTER
CARMELLA from patient after hours with concerns of bleeding at groin site.  S/p flutter ablation 6/16.    PC back to patient this morning.  She reports calling the on call MD last evening who advised pressure.  Her and her son were able to do so.  Covered the incision with gauze and tape.  Patient was up overnight to the bathroom and noted no bleeding.  She reports feeling well today.  Denies any pain at incision site.  Continues to deny bleeding.    She is encouraged to seek ER if bleeding returns, she verbalizes understanding and agrees to plan.  She will call with any ongoing questions or concerns.  DG

## 2025-07-28 ENCOUNTER — OFFICE VISIT (OUTPATIENT)
Dept: CARDIOLOGY | Facility: CLINIC | Age: 85
End: 2025-07-28
Payer: MEDICARE

## 2025-07-28 VITALS
BODY MASS INDEX: 26.79 KG/M2 | RESPIRATION RATE: 16 BRPM | HEIGHT: 65 IN | SYSTOLIC BLOOD PRESSURE: 96 MMHG | DIASTOLIC BLOOD PRESSURE: 70 MMHG | WEIGHT: 160.8 LBS | HEART RATE: 87 BPM

## 2025-07-28 DIAGNOSIS — Z86.79 STATUS POST ABLATION OF ATRIAL FLUTTER: ICD-10-CM

## 2025-07-28 DIAGNOSIS — I10 ESSENTIAL HYPERTENSION: ICD-10-CM

## 2025-07-28 DIAGNOSIS — I48.3 TYPICAL ATRIAL FLUTTER (H): Primary | ICD-10-CM

## 2025-07-28 DIAGNOSIS — Z98.890 STATUS POST ABLATION OF ATRIAL FLUTTER: ICD-10-CM

## 2025-07-28 RX ORDER — RIVAROXABAN 20 MG/1
20 TABLET, FILM COATED ORAL EVERY EVENING
Qty: 90 TABLET | Refills: 3 | Status: SHIPPED | OUTPATIENT
Start: 2025-07-28

## 2025-07-28 RX ORDER — LISINOPRIL 20 MG/1
30 TABLET ORAL DAILY
Qty: 45 TABLET | Refills: 5 | Status: SHIPPED | OUTPATIENT
Start: 2025-07-28

## 2025-07-28 RX ORDER — BIOTIN 1 MG
1000 TABLET ORAL DAILY
COMMUNITY

## 2025-07-28 RX ORDER — VIT C/B6/B5/MAGNESIUM/HERB 173 50-5-6-5MG
CAPSULE ORAL
COMMUNITY

## 2025-07-28 NOTE — LETTER
7/28/2025    Bernie Jane PA-C  [facility] [place] [address]  [facility] [address]    RE: Lolis Pope       Dear Colleague,     I had the pleasure of seeing Lolis Pope in the [facility].       [facility]  Cardiac Electrophysiology  [address]  [place] [address]   Office: [phone]  Fax: [phone]     HEART CARE ELECTROPHYSIOLOGY FOLLOW UP    Primary Care: Bernie Jane MD    Assessment/Recommendations     Typical atrial flutter: Acutely symptomatic with fatigue, weakness and lightheadedness.  No documented history atrial fibrillation.  Status post CTI ablation 6/16/2025. Unremarkable recovery without symptomatology or documentation of recurrent atrial flutter. We discussed relationship to atrial fibrillation, prognosis, and home monitoring with [employer], smart watch with EKG capability or heart rates via pulse oximeter     [id number] 4 for age >75, gender, HTN; HAS BLED 1 for age >65. She denies bleeding complications. She should continue uninterrupted anticoagulation for 3 months after ablation, thereafter can hold for knee surgery at orthopedist discretion      HTN: Controlled on metoprolol, lisinopril     ESTELA: borderline per her report, currently untreated. We discussed the correlation between sleep apnea, atrial arrhythmias and overall cardiovascular health and she was encouraged to consider treatment     Plan:   Continue Xarelto 20 mg daily with dinner for stroke prophylaxis  Continue metoprolol succinate 25 mg daily   Follow-up 1 year post ablation        History of Present Illness/Subjective    Lolis Pope is a 85 year old female with typical atrial flutter, moderate mitral regurgitation, HTN, ESTELA, CKD. She has a remote history of atrial flutter requiring DCCV in 2021.  She was admitted in May with recurrent symptomatic atrial flutter associated with hypotension, converted to sinus rhythm with  "supportive care, and underwent EPS and CTI ablation 2025. Her recovery was unremarkable and she has not had any recurrent symptoms. She has resumed all activity as prior to ablation without any problems though has been limited due to knee pain for which she is rescheduling surgery. She denies groin site issues or neurologic changes post ablation. She denies chest discomfort, palpitations, peripheral edema, shortness of breath, paroxysmal nocturnal dyspnea, orthopnea, lightheadedness/dizziness, or syncope. She lee in [place].        Data Review     Arrhythmia hx:   Sx: Fatigue, weakness, lightheadedness   Sx onset:   Dx/date: AFL 2021  Rate control: Metoprolol succinate 25 mg daily  AAD: None  DCCV: 2021  Ablation: CTI flutter 2025 (Dr. Nugent  [id number]: 4 for age >75, gender, HTN  OAC: Rivaroxaban    EK2025: SB 52 bpm, QRS 92 ms  2025: 2-1 atrial flutter 147 bpm,  ms  Personally reviewed.     TTE 2021    1. Low normal left ventricular systolic function.  LVEF 50%.     2. Mild-to-moderate right ventricular systolic dysfunction.     3. At least moderate central mitral regurgitation.     4. Mild-to-moderate tricuspid regurgitation.     5. Left and right atrial enlargement.     6. No evidence of left atrial appendage or atrial thrombus.     I have reviewed and updated the patient's past medical history, allergy list and medication list.                Physical Examination Review of Systems   BMI= Body mass index is 26.76 kg/m .    Wt Readings from Last 3 Encounters:   25 72.9 kg (160 lb 12.8 oz)   25 74.4 kg (164 lb)   25 76.1 kg (167 lb 12.8 oz)       Vitals: BP 96/70 (BP Location: Right arm, Patient Position: Sitting, Cuff Size: Adult Regular)   Pulse 87   Resp 16   Ht 1.651 m (5' 5\")   Wt 72.9 kg (160 lb 12.8 oz)   BMI 26.76 kg/m    General   Appearance:   Alert and oriented, in no acute distress.    HEENT:  Normocephalic and atraumatic. " Conjunctiva and sclera are clear. Moist oral mucosa.    Neck: No JVP, carotid bruit or obvious thyromegaly.   Lungs:   Respirations unlabored. Clear bilaterally with no rales, rhonchi, or wheezes.     Cardiovascular:   Rhythm is regular. S1 and S2 are normal. No significant murmur is present. Lower extremities demonstrate no significant edema. Posterior tibial pulses are intact bilaterally.   Extremities: No cyanosis or clubbing   Skin: Skin is warm, dry, and otherwise intact.   Neurologic: Gait not assessed. Mood and affect appropriate.    A 12 point comprehensive review of systems was  negative except as noted.      Medical History  Surgical History Family History Social History   Past Medical History:   Diagnosis Date     Osteoporosis     Past Surgical History:   Procedure Laterality Date     APPENDECTOMY       EP ABLATION ATRIAL FLUTTER N/A 6/16/2025    Procedure: Ablation Atrial Flutter;  Surgeon: Adriana Nugent MD;  [facility]: [facility]     GYN SURGERY      hysterectomy     ORTHOPEDIC SURGERY      tib/fib fx ORIF, R)knee replacement     PHACOEMULSIFICATION WITH STANDARD INTRAOCULAR LENS IMPLANT  11/4/2010    PHACOEMULSIFICATION WITH STANDARD INTRAOCULAR LENS IMPLANT performed by ARLINE MONROE at [facility]     PHACOEMULSIFICATION WITH STANDARD INTRAOCULAR LENS IMPLANT  11/22/2010    PHACOEMULSIFICATION WITH STANDARD INTRAOCULAR LENS IMPLANT performed by ARLINE MONROE at [facility]    No family history on file. Social History     Socioeconomic History     Marital status:      Spouse name: Not on file     Number of children: Not on file     Years of education: Not on file     Highest education level: Not on file   Occupational History     Not on file   Tobacco Use     Smoking status: Never     Smokeless tobacco: Not on file   Substance and Sexual Activity     Alcohol use: Yes     Comment: occasional     Drug use: No     Sexual activity: Not on file   Other Topics Concern     Parent/sibling  w/ CABG, MI or angioplasty before 65F 55M? Not Asked   Social History Narrative     Not on file     Social Drivers of Health     Financial Resource Strain: Low Risk  (5/14/2025)    Financial Resource Strain      Within the past 12 months, have you or your family members you live with been unable to get utilities (heat, electricity) when it was really needed?: No   Food Insecurity: Low Risk  (5/14/2025)    Food Insecurity      Within the past 12 months, did you worry that your food would run out before you got money to buy more?: No      Within the past 12 months, did the food you bought just not last and you didn t have money to get more?: No   Transportation Needs: Low Risk  (5/14/2025)    Transportation Needs      Within the past 12 months, has lack of transportation kept you from medical appointments, getting your medicines, non-medical meetings or appointments, work, or from getting things that you need?: No   Physical Activity: Inactive (4/8/2024)    Received from [facility]    Exercise Vital Sign      Days of Exercise per Week: 0 days      Minutes of Exercise per Session: 0 min   Stress: No Stress Concern Present (1/24/2023)    Received from [facility]    [employer] - Occupational Stress Questionnaire      Feeling of Stress : Only a little   Social Connections: Moderately Isolated (1/24/2023)    Received from [facility]    Social Connection and Isolation Panel [NHANES]      Frequency of Communication with Friends and Family: More than three times a week      Frequency of Social Gatherings with Friends and Family: More than three times a week      Attends Anabaptism Services: More than 4 times per year      Active Member of Clubs or Organizations: No      Attends Club or Organization Meetings: Never      Marital Status:    Interpersonal Safety: Not At Risk (1/24/2023)    Received from [facility]    Humiliation, Afraid, Rape, and Kick questionnaire      Fear of Current or  Ex-Partner: No      Emotionally Abused: No      Physically Abused: No      Sexually Abused: No   Housing Stability: Low Risk  (5/14/2025)    Housing Stability      Do you have housing? : Yes      Are you worried about losing your housing?: No          Medications  Allergies   Scheduled Meds:  Current Outpatient Medications   Medication Sig Dispense Refill     acetaminophen (TYLENOL) 500 MG tablet Take 1 tablet by mouth 3 times daily.       biotin 1000 MCG TABS tablet Take 1,000 mcg by mouth daily.       calcium-magnesium (CALMAG) 500-250 MG TABS Take 1 tablet by mouth 2 times daily (with meals).       cholecalciferol (VITAMIN D) 400 UNIT TABS Take 400 Units by mouth daily.       citalopram (CELEXA) 10 MG tablet Take 10 mg by mouth daily.       dorzolamide-timolol (COSOPT) 22.3-6.8 MG/ML ophthalmic solution Place 1 drop into both eyes 2 times daily.       EPINEPHrine (ANY BX GENERIC EQUIV) 0.3 MG/0.3ML injection 2-pack INJECT CONTENTS OF 1 PEN AS NEEDED FOR ALLERGIC REACTION AS NEEDED. MAY REPEAT (Patient taking differently: as needed.)       latanoprost (XALATAN) 0.005 % ophthalmic solution 1 drop at bedtime.       lisinopril (ZESTRIL) 20 MG tablet Take 1.5 tablets (30 mg) by mouth daily. 45 tablet 0     Melatonin 300 MCG TABS Take 1 tablet by mouth at bedtime.       metoprolol succinate ER (TOPROL XL) 25 MG 24 hr tablet Take 25 mg by mouth daily.       pilocarpine (PILOCAR) 1 % ophthalmic solution Place 1 drop into the right eye 3 times daily.       senna (SENOKOT) 8.6 MG tablet Take 1 tablet by mouth every evening.       simvastatin (ZOCOR) 20 MG tablet Take 20 mg by mouth daily.       Turmeric (QC TUMERIC COMPLEX) 500 MG CAPS Take by mouth.       vitamin B complex with vitamin C (VITAMIN  B COMPLEX) tablet Take 1 tablet by mouth daily.       vitamin E 400 UNIT capsule Take 1 capsule by mouth daily.       XARELTO ANTICOAGULANT 20 MG TABS tablet Take 20 mg by mouth every evening.      Allergies   Allergen Reactions      Seafood GI Disturbance and Hives     Shrimp      Other Reaction(s): Extrapyramidal Symptoms     Apixaban Rash     Influenza Virus Vaccine      Amoxicillin Rash         Lab Results    Chemistry/lipid CBC Cardiac Enzymes/BNP/TSH/INR   Lab Results   Component Value Date    BUN 15.6 2025     2025    CO2 27 2025    Lab Results   Component Value Date    WBC 5.7 2025    HGB 13.1 2025    HCT 39.4 2025    MCV 94 2025     2025    @RESUFAST(BMP,CBC,BNP,TSH,  INR)@      30 minutes spent reviewing prior records (including documentation, laboratory studies, cardiac testing/imaging), history and physical exam, planning, and subsequent documentation.     The longitudinal plan of care for the diagnosis(es)/condition(s) as documented were addressed during this visit. Due to the added complexity in care, I will continue to support Lolis in the subsequent management and with ongoing continuity of care.      This note has been dictated using voice recognition software. Any grammatical, typographical, or context distortions are unintentional and inherent to the software.    Angela Barajas CNP  Clinical Cardiac Electrophysiology  [facility]  Clinic and [phone]  Fax: [phone]  Electrophysiology Nurses: [phone]      Thank you for allowing me to participate in the care of your patient.      Sincerely,     OPAL Gtz CNP     [facility]  cc:   Anoop Méndez, DO  [facility] [place] [address]

## 2025-07-28 NOTE — PROGRESS NOTES
[facility]  Cardiac Electrophysiology  [address]  [place] [address]   Office: [phone]  Fax: [phone]     HEART CARE ELECTROPHYSIOLOGY FOLLOW UP    Primary Care: Bernie Jane MD    Assessment/Recommendations     Typical atrial flutter: Acutely symptomatic with fatigue, weakness and lightheadedness.  No documented history atrial fibrillation.  Status post CTI ablation 6/16/2025. Unremarkable recovery without symptomatology or documentation of recurrent atrial flutter. We discussed relationship to atrial fibrillation, prognosis, and home monitoring with Directed Edge, smart watch with EKG capability or heart rates via pulse oximeter     [id number] 4 for age >75, gender, HTN; HAS BLED 1 for age >65. She denies bleeding complications. She should continue uninterrupted anticoagulation for 3 months after ablation, thereafter can hold for knee surgery at orthopedist discretion      HTN: Controlled on metoprolol, lisinopril     ESTELA: borderline per her report, currently untreated. We discussed the correlation between sleep apnea, atrial arrhythmias and overall cardiovascular health and she was encouraged to consider treatment     Plan:   Continue Xarelto 20 mg daily with dinner for stroke prophylaxis  Continue metoprolol succinate 25 mg daily   Follow-up 1 year post ablation        History of Present Illness/Subjective    Lolis Pope is a 85 year old female with typical atrial flutter, moderate mitral regurgitation, HTN, ESTELA, CKD. She has a remote history of atrial flutter requiring DCCV in 2021.  She was admitted in May with recurrent symptomatic atrial flutter associated with hypotension, converted to sinus rhythm with supportive care, and underwent EPS and CTI ablation 6/16/2025. Her recovery was unremarkable and she has not had any recurrent symptoms. She has resumed all activity as prior to ablation without any problems though has been limited due to  "knee pain for which she is rescheduling surgery. She denies groin site issues or neurologic changes post ablation. She denies chest discomfort, palpitations, peripheral edema, shortness of breath, paroxysmal nocturnal dyspnea, orthopnea, lightheadedness/dizziness, or syncope. She lee in [place].        Data Review     Arrhythmia hx:   Sx: Fatigue, weakness, lightheadedness   Sx onset:   Dx/date: AFL 2021  Rate control: Metoprolol succinate 25 mg daily  AAD: None  DCCV: 2021  Ablation: CTI flutter 2025 (Dr. Nugent  [id number]: 4 for age >75, gender, HTN  OAC: Rivaroxaban    EK2025: SB 52 bpm, QRS 92 ms  2025: 2-1 atrial flutter 147 bpm,  ms  Personally reviewed.     TTE 2021    1. Low normal left ventricular systolic function.  LVEF 50%.     2. Mild-to-moderate right ventricular systolic dysfunction.     3. At least moderate central mitral regurgitation.     4. Mild-to-moderate tricuspid regurgitation.     5. Left and right atrial enlargement.     6. No evidence of left atrial appendage or atrial thrombus.     I have reviewed and updated the patient's past medical history, allergy list and medication list.                Physical Examination Review of Systems   BMI= Body mass index is 26.76 kg/m .    Wt Readings from Last 3 Encounters:   25 72.9 kg (160 lb 12.8 oz)   25 74.4 kg (164 lb)   25 76.1 kg (167 lb 12.8 oz)       Vitals: BP 96/70 (BP Location: Right arm, Patient Position: Sitting, Cuff Size: Adult Regular)   Pulse 87   Resp 16   Ht 1.651 m (5' 5\")   Wt 72.9 kg (160 lb 12.8 oz)   BMI 26.76 kg/m    General   Appearance:   Alert and oriented, in no acute distress.    HEENT:  Normocephalic and atraumatic. Conjunctiva and sclera are clear. Moist oral mucosa.    Neck: No JVP, carotid bruit or obvious thyromegaly.   Lungs:   Respirations unlabored. Clear bilaterally with no rales, rhonchi, or wheezes.     Cardiovascular:   Rhythm is regular. S1 " and S2 are normal. No significant murmur is present. Lower extremities demonstrate no significant edema. Posterior tibial pulses are intact bilaterally.   Extremities: No cyanosis or clubbing   Skin: Skin is warm, dry, and otherwise intact.   Neurologic: Gait not assessed. Mood and affect appropriate.    A 12 point comprehensive review of systems was  negative except as noted.      Medical History  Surgical History Family History Social History   Past Medical History:   Diagnosis Date    Osteoporosis     Past Surgical History:   Procedure Laterality Date    APPENDECTOMY      EP ABLATION ATRIAL FLUTTER N/A 6/16/2025    Procedure: Ablation Atrial Flutter;  Surgeon: Adriana Nugent MD;  Location: [facility] LAB CV    GYN SURGERY      hysterectomy    ORTHOPEDIC SURGERY      tib/fib fx ORIF, R)knee replacement    PHACOEMULSIFICATION WITH STANDARD INTRAOCULAR LENS IMPLANT  11/4/2010    PHACOEMULSIFICATION WITH STANDARD INTRAOCULAR LENS IMPLANT performed by ARLINE MONROE at [facility]    PHACOEMULSIFICATION WITH STANDARD INTRAOCULAR LENS IMPLANT  11/22/2010    PHACOEMULSIFICATION WITH STANDARD INTRAOCULAR LENS IMPLANT performed by ARLINE MONROE at [facility]    No family history on file. Social History     Socioeconomic History    Marital status:      Spouse name: Not on file    Number of children: Not on file    Years of education: Not on file    Highest education level: Not on file   Occupational History    Not on file   Tobacco Use    Smoking status: Never    Smokeless tobacco: Not on file   Substance and Sexual Activity    Alcohol use: Yes     Comment: occasional    Drug use: No    Sexual activity: Not on file   Other Topics Concern    Parent/sibling w/ CABG, MI or angioplasty before 65F 55M? Not Asked   Social History Narrative    Not on file     Social Drivers of Health     Financial Resource Strain: Low Risk  (5/14/2025)    Financial Resource Strain     Within the past 12 months, have you or your  family members you live with been unable to get utilities (heat, electricity) when it was really needed?: No   Food Insecurity: Low Risk  (5/14/2025)    Food Insecurity     Within the past 12 months, did you worry that your food would run out before you got money to buy more?: No     Within the past 12 months, did the food you bought just not last and you didn t have money to get more?: No   Transportation Needs: Low Risk  (5/14/2025)    Transportation Needs     Within the past 12 months, has lack of transportation kept you from medical appointments, getting your medicines, non-medical meetings or appointments, work, or from getting things that you need?: No   Physical Activity: Inactive (4/8/2024)    Received from [facility]    Exercise Vital Sign     Days of Exercise per Week: 0 days     Minutes of Exercise per Session: 0 min   Stress: No Stress Concern Present (1/24/2023)    Received from [facility]    [employer] - Occupational Stress Questionnaire     Feeling of Stress : Only a little   Social Connections: Moderately Isolated (1/24/2023)    Received from [facility]    Social Connection and Isolation Panel [NHANES]     Frequency of Communication with Friends and Family: More than three times a week     Frequency of Social Gatherings with Friends and Family: More than three times a week     Attends Tenriism Services: More than 4 times per year     Active Member of Clubs or Organizations: No     Attends Club or Organization Meetings: Never     Marital Status:    Interpersonal Safety: Not At Risk (1/24/2023)    Received from [facility]    Humiliation, Afraid, Rape, and Kick questionnaire     Fear of Current or Ex-Partner: No     Emotionally Abused: No     Physically Abused: No     Sexually Abused: No   Housing Stability: Low Risk  (5/14/2025)    Housing Stability     Do you have housing? : Yes     Are you worried about losing your housing?: No          Medications  Allergies    Scheduled Meds:  Current Outpatient Medications   Medication Sig Dispense Refill    acetaminophen (TYLENOL) 500 MG tablet Take 1 tablet by mouth 3 times daily.      biotin 1000 MCG TABS tablet Take 1,000 mcg by mouth daily.      calcium-magnesium (CALMAG) 500-250 MG TABS Take 1 tablet by mouth 2 times daily (with meals).      cholecalciferol (VITAMIN D) 400 UNIT TABS Take 400 Units by mouth daily.      citalopram (CELEXA) 10 MG tablet Take 10 mg by mouth daily.      dorzolamide-timolol (COSOPT) 22.3-6.8 MG/ML ophthalmic solution Place 1 drop into both eyes 2 times daily.      EPINEPHrine (ANY BX GENERIC EQUIV) 0.3 MG/0.3ML injection 2-pack INJECT CONTENTS OF 1 PEN AS NEEDED FOR ALLERGIC REACTION AS NEEDED. MAY REPEAT (Patient taking differently: as needed.)      latanoprost (XALATAN) 0.005 % ophthalmic solution 1 drop at bedtime.      lisinopril (ZESTRIL) 20 MG tablet Take 1.5 tablets (30 mg) by mouth daily. 45 tablet 0    Melatonin 300 MCG TABS Take 1 tablet by mouth at bedtime.      metoprolol succinate ER (TOPROL XL) 25 MG 24 hr tablet Take 25 mg by mouth daily.      pilocarpine (PILOCAR) 1 % ophthalmic solution Place 1 drop into the right eye 3 times daily.      senna (SENOKOT) 8.6 MG tablet Take 1 tablet by mouth every evening.      simvastatin (ZOCOR) 20 MG tablet Take 20 mg by mouth daily.      Turmeric (QC TUMERIC COMPLEX) 500 MG CAPS Take by mouth.      vitamin B complex with vitamin C (VITAMIN  B COMPLEX) tablet Take 1 tablet by mouth daily.      vitamin E 400 UNIT capsule Take 1 capsule by mouth daily.      XARELTO ANTICOAGULANT 20 MG TABS tablet Take 20 mg by mouth every evening.      Allergies   Allergen Reactions    Seafood GI Disturbance and Hives    Shrimp      Other Reaction(s): Extrapyramidal Symptoms    Apixaban Rash    Influenza Virus Vaccine     Amoxicillin Rash         Lab Results    Chemistry/lipid CBC Cardiac Enzymes/BNP/TSH/INR   Lab Results   Component Value Date    BUN 15.6 06/16/2025      2025    CO2 27 2025    Lab Results   Component Value Date    WBC 5.7 2025    HGB 13.1 2025    HCT 39.4 2025    MCV 94 2025     2025    @RESUFAST(BMP,CBC,BNP,TSH,  INR)@      30 minutes spent reviewing prior records (including documentation, laboratory studies, cardiac testing/imaging), history and physical exam, planning, and subsequent documentation.     The longitudinal plan of care for the diagnosis(es)/condition(s) as documented were addressed during this visit. Due to the added complexity in care, I will continue to support Lolis in the subsequent management and with ongoing continuity of care.      This note has been dictated using voice recognition software. Any grammatical, typographical, or context distortions are unintentional and inherent to the software.    Angela Barajas, CNP  Clinical Cardiac Electrophysiology  [facility]  Clinic and [phone]  Fax: [phone]  Electrophysiology Nurses: [phone]

## 2025-07-30 ENCOUNTER — TRANSFERRED RECORDS (OUTPATIENT)
Dept: HEALTH INFORMATION MANAGEMENT | Facility: CLINIC | Age: 85
End: 2025-07-30
Payer: COMMERCIAL

## 2025-07-30 ENCOUNTER — TRANSCRIBE ORDERS (OUTPATIENT)
Dept: OTHER | Age: 85
End: 2025-07-30

## 2025-07-30 DIAGNOSIS — H02.401 PTOSIS OF RIGHT EYELID: Primary | ICD-10-CM

## 2025-08-18 ENCOUNTER — TELEPHONE (OUTPATIENT)
Dept: CARDIOLOGY | Facility: CLINIC | Age: 85
End: 2025-08-18
Payer: MEDICARE

## 2025-08-18 DIAGNOSIS — I10 ESSENTIAL HYPERTENSION: ICD-10-CM

## 2025-08-18 DIAGNOSIS — I48.3 TYPICAL ATRIAL FLUTTER (H): ICD-10-CM

## 2025-08-18 RX ORDER — LISINOPRIL 20 MG/1
30 TABLET ORAL DAILY
Qty: 45 TABLET | Refills: 2 | Status: SHIPPED | OUTPATIENT
Start: 2025-08-18

## 2025-08-18 RX ORDER — RIVAROXABAN 20 MG/1
20 TABLET, FILM COATED ORAL EVERY EVENING
Qty: 90 TABLET | Refills: 3 | Status: SHIPPED | OUTPATIENT
Start: 2025-08-18

## (undated) DEVICE — CATH THERMOCOOL SMARTTOUCH SF DF CURVE

## (undated) DEVICE — TUBE SET SMARKABLATE IRRIGATION

## (undated) DEVICE — PATCH CARTO 3 EXTERNAL REFERENCE 3D MAPPING CREFP6

## (undated) DEVICE — ELECTRODE ADULT PACING MULTI P-211-M1

## (undated) DEVICE — CUSTOM PACK EP

## (undated) DEVICE — GUIDEWIRE JTIP 3MM .035 180CM IQ35F180J3

## (undated) DEVICE — INTRODUCER SHEATH VASC CATH 8.5FR CARTO GIDE STH MED D138502

## (undated) DEVICE — INTRO TERUMO 8FRX25CM W/MARKER RSB803

## (undated) DEVICE — SHEATH PINNACLE 9/25/038 RSS905

## (undated) DEVICE — INTRO MICRO MINI STICK 4FR STIFF NITINOL 45-753

## (undated) DEVICE — CATH EP 7FR X 115CM DECANAV CA

## (undated) DEVICE — 8F SOUNDSTAR ECO ULTRASOUND CATHETER

## (undated) RX ORDER — ACETAMINOPHEN 325 MG/1
TABLET ORAL
Status: DISPENSED
Start: 2025-06-16

## (undated) RX ORDER — LIDOCAINE HYDROCHLORIDE 10 MG/ML
INJECTION, SOLUTION EPIDURAL; INFILTRATION; INTRACAUDAL; PERINEURAL
Status: DISPENSED
Start: 2025-06-16

## (undated) RX ORDER — ONDANSETRON 2 MG/ML
INJECTION INTRAMUSCULAR; INTRAVENOUS
Status: DISPENSED
Start: 2025-06-16

## (undated) RX ORDER — PROPOFOL 10 MG/ML
INJECTION, EMULSION INTRAVENOUS
Status: DISPENSED
Start: 2025-06-16

## (undated) RX ORDER — DEXAMETHASONE SODIUM PHOSPHATE 10 MG/ML
INJECTION, SOLUTION INTRAMUSCULAR; INTRAVENOUS
Status: DISPENSED
Start: 2025-06-16